# Patient Record
Sex: MALE | Race: WHITE | NOT HISPANIC OR LATINO
[De-identification: names, ages, dates, MRNs, and addresses within clinical notes are randomized per-mention and may not be internally consistent; named-entity substitution may affect disease eponyms.]

---

## 2017-01-06 ENCOUNTER — CHARTING TRANS (OUTPATIENT)
Dept: OTHER | Age: 77
End: 2017-01-06

## 2017-02-08 ENCOUNTER — CHARTING TRANS (OUTPATIENT)
Dept: OTHER | Age: 77
End: 2017-02-08

## 2017-03-01 ENCOUNTER — CHARTING TRANS (OUTPATIENT)
Dept: OTHER | Age: 77
End: 2017-03-01

## 2017-03-08 ENCOUNTER — CHARTING TRANS (OUTPATIENT)
Dept: INTERNAL MEDICINE | Age: 77
End: 2017-03-08

## 2017-03-10 ENCOUNTER — CHARTING TRANS (OUTPATIENT)
Dept: OTHER | Age: 77
End: 2017-03-10

## 2017-03-13 ENCOUNTER — CHARTING TRANS (OUTPATIENT)
Dept: OTHER | Age: 77
End: 2017-03-13

## 2017-03-22 ENCOUNTER — IMAGING SERVICES (OUTPATIENT)
Dept: OTHER | Age: 77
End: 2017-03-22

## 2017-05-15 ENCOUNTER — CHARTING TRANS (OUTPATIENT)
Dept: PULMONOLOGY | Age: 77
End: 2017-05-15

## 2017-09-11 ENCOUNTER — CHARTING TRANS (OUTPATIENT)
Dept: OTHER | Age: 77
End: 2017-09-11

## 2017-09-11 ENCOUNTER — LAB SERVICES (OUTPATIENT)
Dept: OTHER | Age: 77
End: 2017-09-11

## 2017-09-11 LAB
INTERNATIONAL NORMAL: 1
PROTHROMBIN TIME: 10.3 SECONDS
PTT: 22.8 SECONDS

## 2017-09-12 ENCOUNTER — CHARTING TRANS (OUTPATIENT)
Dept: OTHER | Age: 77
End: 2017-09-12

## 2017-09-19 ENCOUNTER — CHARTING TRANS (OUTPATIENT)
Dept: INTERNAL MEDICINE | Age: 77
End: 2017-09-19

## 2017-09-19 ENCOUNTER — CHARTING TRANS (OUTPATIENT)
Dept: OTHER | Age: 77
End: 2017-09-19

## 2017-09-20 ENCOUNTER — IMAGING SERVICES (OUTPATIENT)
Dept: OTHER | Age: 77
End: 2017-09-20

## 2017-09-20 ENCOUNTER — CHARTING TRANS (OUTPATIENT)
Dept: OTHER | Age: 77
End: 2017-09-20

## 2017-09-22 ENCOUNTER — CHARTING TRANS (OUTPATIENT)
Dept: OTHER | Age: 77
End: 2017-09-22

## 2017-09-25 ENCOUNTER — IMAGING SERVICES (OUTPATIENT)
Dept: OTHER | Age: 77
End: 2017-09-25

## 2017-09-25 ENCOUNTER — LAB SERVICES (OUTPATIENT)
Dept: OTHER | Age: 77
End: 2017-09-25

## 2017-09-25 ENCOUNTER — CHARTING TRANS (OUTPATIENT)
Dept: INTERNAL MEDICINE | Age: 77
End: 2017-09-25

## 2017-09-25 LAB
BILIRUBIN URINE: NEGATIVE
BLOOD URINE: ABNORMAL
CLARITY: CLEAR
COLOR: YELLOW
GLUCOSE QUALITATIVE U: NEGATIVE
KETONES, URINE: NEGATIVE
LEUKOCYTE ESTERASE URINE: NEGATIVE
NITRITE URINE: NEGATIVE
PH URINE: 5.5 (ref 5–7)
SPECIFIC GRAVITY URINE: 1.02 (ref 1–1.03)
URINE PROTEIN, QUAL (DIPSTICK): NEGATIVE
UROBILINOGEN URINE: 0.2

## 2017-09-26 LAB — FINAL REPORT: NORMAL

## 2017-09-28 ENCOUNTER — CHARTING TRANS (OUTPATIENT)
Dept: OTHER | Age: 77
End: 2017-09-28

## 2017-10-06 ENCOUNTER — CHARTING TRANS (OUTPATIENT)
Dept: OTHER | Age: 77
End: 2017-10-06

## 2017-10-06 ENCOUNTER — LAB SERVICES (OUTPATIENT)
Dept: OTHER | Age: 77
End: 2017-10-06

## 2017-10-10 ENCOUNTER — CHARTING TRANS (OUTPATIENT)
Dept: OTHER | Age: 77
End: 2017-10-10

## 2017-10-13 ENCOUNTER — CHARTING TRANS (OUTPATIENT)
Dept: OTHER | Age: 77
End: 2017-10-13

## 2017-10-24 ENCOUNTER — CHARTING TRANS (OUTPATIENT)
Dept: OTHER | Age: 77
End: 2017-10-24

## 2017-11-03 ENCOUNTER — CHARTING TRANS (OUTPATIENT)
Dept: UROLOGY | Age: 77
End: 2017-11-03

## 2017-12-07 ENCOUNTER — CHARTING TRANS (OUTPATIENT)
Dept: OTOLARYNGOLOGY | Age: 77
End: 2017-12-07

## 2018-01-02 LAB — STONE ANALYSIS: NORMAL

## 2018-01-03 ENCOUNTER — CHARTING TRANS (OUTPATIENT)
Dept: OTHER | Age: 78
End: 2018-01-03

## 2018-01-25 ENCOUNTER — LAB SERVICES (OUTPATIENT)
Dept: OTHER | Age: 78
End: 2018-01-25

## 2018-01-25 ENCOUNTER — CHARTING TRANS (OUTPATIENT)
Dept: OTHER | Age: 78
End: 2018-01-25

## 2018-01-25 LAB
INFLUENZA TYPE A ANTIGEN: NEGATIVE
INFLUENZA TYPE B ANTIGEN: NEGATIVE

## 2018-02-15 ENCOUNTER — CHARTING TRANS (OUTPATIENT)
Dept: OTHER | Age: 78
End: 2018-02-15

## 2018-02-19 ENCOUNTER — CHARTING TRANS (OUTPATIENT)
Dept: OTHER | Age: 78
End: 2018-02-19

## 2018-02-20 ENCOUNTER — LAB SERVICES (OUTPATIENT)
Dept: OTHER | Age: 78
End: 2018-02-20

## 2018-02-20 LAB
ALBUMIN SERPL BCG-MCNC: 4.4 G/DL (ref 3.6–5.1)
ALP SERPL-CCNC: 89 U/L (ref 30–130)
ALT SERPL W/O P-5'-P-CCNC: 32 U/L (ref 17–47)
AST SERPL-CCNC: 30 U/L (ref 14–43)
BILIRUB SERPL-MCNC: 0.6 MG/DL (ref 0–1.3)
BUN SERPL-MCNC: 18 MG/DL (ref 6–27)
CALCIUM SERPL-MCNC: 9.5 MG/DL (ref 8.6–10.6)
CHLORIDE SERPL-SCNC: 103 MMOL/L (ref 96–107)
CHOLEST SERPL-MCNC: 146 MG/DL (ref 140–200)
CREATININE, SERUM: 1.3 MG/DL (ref 0.6–1.6)
GFR SERPL CREATININE-BSD FRML MDRD: 54 ML/MIN/{1.73M2}
GFR SERPL CREATININE-BSD FRML MDRD: >60 ML/MIN/{1.73M2}
GLUCOSE P FAST SERPL-MCNC: 99 MG/DL (ref 60–100)
HCO3 SERPL-SCNC: 30 MMOL/L (ref 22–32)
HDLC SERPL-MCNC: 39 MG/DL
LDLC SERPL CALC-MCNC: 83 MG/DL (ref 30–100)
POTASSIUM SERPL-SCNC: 4.5 MMOL/L (ref 3.5–5.3)
PROT SERPL-MCNC: 7.2 G/DL (ref 6.4–8.5)
SODIUM SERPL-SCNC: 144 MMOL/L (ref 136–146)
TRIGL SERPL-MCNC: 122 MG/DL (ref 0–200)

## 2018-03-19 ENCOUNTER — LAB SERVICES (OUTPATIENT)
Dept: OTHER | Age: 78
End: 2018-03-19

## 2018-03-19 ENCOUNTER — CHARTING TRANS (OUTPATIENT)
Dept: OTHER | Age: 78
End: 2018-03-19

## 2018-03-19 LAB
T3FREE SERPL-MCNC: 4.6 PG/ML (ref 2.8–5.3)
TSH SERPL DL<=0.05 MIU/L-ACNC: 6.43 M[IU]/L (ref 0.3–4.82)
VIT B12 SERPL-MCNC: 179 PG/ML (ref 193–982)

## 2018-04-04 ENCOUNTER — CHARTING TRANS (OUTPATIENT)
Dept: OTHER | Age: 78
End: 2018-04-04

## 2018-04-06 ENCOUNTER — CHARTING TRANS (OUTPATIENT)
Dept: OTHER | Age: 78
End: 2018-04-06

## 2018-04-12 ENCOUNTER — CHARTING TRANS (OUTPATIENT)
Dept: OTHER | Age: 78
End: 2018-04-12

## 2018-04-12 ASSESSMENT — PAIN SCALES - GENERAL: PAINLEVEL_OUTOF10: 5

## 2018-04-24 ENCOUNTER — CHARTING TRANS (OUTPATIENT)
Dept: OTHER | Age: 78
End: 2018-04-24

## 2018-05-04 ENCOUNTER — ANCILLARY ORDERS (OUTPATIENT)
Dept: OTHER | Age: 78
End: 2018-05-04

## 2018-05-04 ENCOUNTER — CHARTING TRANS (OUTPATIENT)
Dept: OTHER | Age: 78
End: 2018-05-04

## 2018-05-04 DIAGNOSIS — N28.9 DISORDER OF KIDNEY AND URETER: ICD-10-CM

## 2018-05-04 DIAGNOSIS — N13.5 CROSSING VESSEL AND STRICTURE OF URETER WITHOUT HYDRONEPHROSIS: ICD-10-CM

## 2018-05-11 ENCOUNTER — CHARTING TRANS (OUTPATIENT)
Dept: OTHER | Age: 78
End: 2018-05-11

## 2018-05-11 ASSESSMENT — PAIN SCALES - GENERAL: PAINLEVEL_OUTOF10: 5

## 2018-06-01 ENCOUNTER — CHARTING TRANS (OUTPATIENT)
Dept: OTHER | Age: 78
End: 2018-06-01

## 2018-06-13 ENCOUNTER — CHARTING TRANS (OUTPATIENT)
Dept: OTHER | Age: 78
End: 2018-06-13

## 2018-06-25 ENCOUNTER — CHARTING TRANS (OUTPATIENT)
Dept: OTHER | Age: 78
End: 2018-06-25

## 2018-06-25 ENCOUNTER — LAB SERVICES (OUTPATIENT)
Dept: OTHER | Age: 78
End: 2018-06-25

## 2018-06-25 LAB — DEPRECATED S PYO AG THROAT QL EIA: NEGATIVE

## 2018-06-25 ASSESSMENT — PAIN SCALES - GENERAL: PAINLEVEL_OUTOF10: 5

## 2018-07-06 ENCOUNTER — LAB SERVICES (OUTPATIENT)
Dept: OTHER | Age: 78
End: 2018-07-06

## 2018-07-06 LAB
ALBUMIN SERPL-MCNC: 4.3 G/DL (ref 3.6–5.1)
ALP SERPL-CCNC: 97 U/L (ref 45–115)
ALT SERPL-CCNC: 26 U/L (ref 17–47)
AST SERPL-CCNC: 28 U/L (ref 14–43)
BASOPHIL %: 0.4 % (ref 0–1.2)
BASOPHIL ABSOLUTE #: 0 10*3/UL (ref 0–0.1)
BILIRUB SERPL-MCNC: 0.6 MG/DL (ref 0–1.3)
BUN SERPL-MCNC: 20 MG/DL (ref 6–27)
CALCIUM SERPL-MCNC: 9 MG/DL (ref 8.6–10.6)
CHLORIDE SERPL-SCNC: 105 MMOL/L (ref 96–107)
CO2 SERPL-SCNC: 28 MMOL/L (ref 22–32)
CREAT SERPL-MCNC: 1.3 MG/DL (ref 0.6–1.6)
DIFFERENTIAL TYPE: ABNORMAL
EOSINOPHIL %: 2.5 % (ref 0–10)
EOSINOPHIL ABSOLUTE #: 0.1 10*3/UL (ref 0–0.5)
GFR SERPL CREATININE-BSD FRML MDRD: 54 ML/MIN/{1.73M2}
GFR SERPL CREATININE-BSD FRML MDRD: >60 ML/MIN/{1.73M2}
GLUCOSE SERPL-MCNC: 82 MG/DL (ref 70–200)
HEMATOCRIT: 42.5 % (ref 40–51)
HEMOGLOBIN: 14.7 G/DL (ref 13.7–17.5)
LDH SERPL P TO L-CCNC: 497 U/L (ref 313–618)
LYMPH PERCENT: 19.6 % (ref 20.5–51.1)
LYMPHOCYTE ABSOLUTE #: 1.1 10*3/UL (ref 1.2–3.4)
MEAN CORPUSCULAR HGB CONCENTRATION: 34.6 % (ref 32–36)
MEAN CORPUSCULAR HGB: 31.9 PG (ref 27–34)
MEAN CORPUSCULAR VOLUME: 92.2 FL (ref 79–95)
MEAN PLATELET VOLUME: 10.1 FL (ref 8.6–12.4)
MONOCYTE ABSOLUTE #: 0.5 10*3/UL (ref 0.2–0.9)
MONOCYTE PERCENT: 8.4 % (ref 4.3–12.9)
NEUTROPHIL ABSOLUTE #: 3.9 10*3/UL (ref 1.4–6.5)
NEUTROPHIL PERCENT: 69.1 % (ref 34–73.5)
PLATELET COUNT: 179 10*3/UL (ref 150–400)
POTASSIUM SERPL-SCNC: 4.4 MMOL/L (ref 3.5–5.3)
PROT SERPL-MCNC: 7.1 G/DL (ref 6.4–8.5)
RED BLOOD CELL COUNT: 4.61 10*6/UL (ref 3.9–5.7)
RED CELL DISTRIBUTION WIDTH: 12.8 % (ref 11.3–14.8)
SODIUM SERPL-SCNC: 143 MMOL/L (ref 136–146)
T3FREE SERPL-MCNC: 3.9 PG/ML (ref 2.8–5.3)
TSH SERPL DL<=0.05 MIU/L-ACNC: 5.88 M[IU]/L (ref 0.3–4.82)
WHITE BLOOD CELL COUNT: 5.6 10*3/UL (ref 4–10)

## 2018-07-12 ENCOUNTER — CHARTING TRANS (OUTPATIENT)
Dept: OTHER | Age: 78
End: 2018-07-12

## 2018-08-25 ENCOUNTER — IMAGING SERVICES (OUTPATIENT)
Dept: OTHER | Age: 78
End: 2018-08-25

## 2018-08-26 ENCOUNTER — LAB SERVICES (OUTPATIENT)
Dept: OTHER | Age: 78
End: 2018-08-26

## 2018-08-26 LAB
APPEARANCE: CLEAR
BASOPHIL AUTOMATED: 0.1 %
BASOPHILS: 0 (ref 0–0.2)
BILIRUBIN: ABNORMAL
COLOR: YELLOW
EOSINOPHIL AUTOMATED: 1.8 %
EOSINOPHILS: 0.1 (ref 0–0.5)
GLUCOSE, URINE, AUTOMATED: ABNORMAL MG/DL
HEMATOCRIT: 43.3 % (ref 38.6–49.2)
HEMOGLOBIN: 14.9 GM/DL (ref 13–17)
KETONES, URINE, AUTOMATED: ABNORMAL MG/DL
LEUKOCYTE, URINE, AUTOMATED: ABNORMAL
LYMPHOCYTE AUTOMATED: 10.3 %
LYMPHOCYTES: 0.8 (ref 0.6–3.4)
MEAN CORPUSCULAR HGB: 31.1 PG (ref 26–34)
MEAN CORPUSCULAR HGB: 34.5 G/DL (ref 32.5–35.8)
MEAN CORPUSCULAR VOL: 90.2 FL (ref 80–100)
MEAN PLATELET VOLUME: 8.6 FL (ref 6.8–10.2)
MONOCYTE AUTOMATED: 8 %
MONOCYTES: 0.6 (ref 0.3–1)
NEUTROPHIL ABSOLUTE: 6 (ref 1.7–7.7)
NEUTROPHIL AUTOMATED: 79.8 %
NITRITE, URINE AUTOMATED: NEGATIVE
PH, URINE: 6 (ref 5–8)
PLATELET COUNT: 132 K/MM3 (ref 150–450)
PROTEIN, URINE: ABNORMAL MG/DL
RED BLOOD CELL COUNT: 4.8 M/MM3 (ref 4.34–5.6)
RED CELL DISTRIBUTIO: 13.9 % (ref 11.9–15.9)
SPECIFIC GRAVITY UA: 1.01 (ref 1–1.03)
URINE, BLOOD, AUTOMATED: ABNORMAL
UROBILINOGEN, URINE, AUTOMATED: ABNORMAL MG/DL
WHITE BLOOD CELL COU: 7.6 K/MM3 (ref 4–11)

## 2018-08-27 ENCOUNTER — CHARTING TRANS (OUTPATIENT)
Dept: OTHER | Age: 78
End: 2018-08-27

## 2018-08-28 ENCOUNTER — CHARTING TRANS (OUTPATIENT)
Dept: OTHER | Age: 78
End: 2018-08-28

## 2018-08-29 ENCOUNTER — CHARTING TRANS (OUTPATIENT)
Dept: OTHER | Age: 78
End: 2018-08-29

## 2018-08-30 ENCOUNTER — CHARTING TRANS (OUTPATIENT)
Dept: OTHER | Age: 78
End: 2018-08-30

## 2018-08-30 ENCOUNTER — IMAGING SERVICES (OUTPATIENT)
Dept: OTHER | Age: 78
End: 2018-08-30

## 2018-08-31 ENCOUNTER — CHARTING TRANS (OUTPATIENT)
Dept: OTHER | Age: 78
End: 2018-08-31

## 2018-08-31 ENCOUNTER — IMAGING SERVICES (OUTPATIENT)
Dept: OTHER | Age: 78
End: 2018-08-31

## 2018-09-04 ENCOUNTER — CHARTING TRANS (OUTPATIENT)
Dept: OTHER | Age: 78
End: 2018-09-04

## 2018-09-06 ENCOUNTER — CHARTING TRANS (OUTPATIENT)
Dept: OTHER | Age: 78
End: 2018-09-06

## 2018-09-07 ENCOUNTER — CHARTING TRANS (OUTPATIENT)
Dept: OTHER | Age: 78
End: 2018-09-07

## 2018-09-10 ENCOUNTER — CHARTING TRANS (OUTPATIENT)
Dept: OTHER | Age: 78
End: 2018-09-10

## 2018-09-12 ENCOUNTER — IMAGING SERVICES (OUTPATIENT)
Dept: OTHER | Age: 78
End: 2018-09-12

## 2018-09-14 ENCOUNTER — CHARTING TRANS (OUTPATIENT)
Dept: OTHER | Age: 78
End: 2018-09-14

## 2018-09-28 ENCOUNTER — CHARTING TRANS (OUTPATIENT)
Dept: OTHER | Age: 78
End: 2018-09-28

## 2018-10-15 ENCOUNTER — CHARTING TRANS (OUTPATIENT)
Dept: OTHER | Age: 78
End: 2018-10-15

## 2018-10-26 ENCOUNTER — CHARTING TRANS (OUTPATIENT)
Dept: OTHER | Age: 78
End: 2018-10-26

## 2018-10-29 ENCOUNTER — CHARTING TRANS (OUTPATIENT)
Dept: OTHER | Age: 78
End: 2018-10-29

## 2018-11-01 ENCOUNTER — CHARTING TRANS (OUTPATIENT)
Dept: OTHER | Age: 78
End: 2018-11-01

## 2018-11-23 ENCOUNTER — IMAGING SERVICES (OUTPATIENT)
Dept: OTHER | Age: 78
End: 2018-11-23

## 2018-11-27 VITALS — HEIGHT: 72 IN | BODY MASS INDEX: 25.06 KG/M2 | WEIGHT: 185 LBS

## 2018-11-28 VITALS
HEIGHT: 72 IN | SYSTOLIC BLOOD PRESSURE: 112 MMHG | WEIGHT: 191 LBS | HEART RATE: 66 BPM | BODY MASS INDEX: 25.87 KG/M2 | DIASTOLIC BLOOD PRESSURE: 72 MMHG | OXYGEN SATURATION: 95 %

## 2018-11-28 VITALS
HEART RATE: 63 BPM | DIASTOLIC BLOOD PRESSURE: 64 MMHG | RESPIRATION RATE: 16 BRPM | SYSTOLIC BLOOD PRESSURE: 100 MMHG | TEMPERATURE: 98.5 F | WEIGHT: 186 LBS | OXYGEN SATURATION: 96 %

## 2018-11-28 VITALS
HEART RATE: 65 BPM | BODY MASS INDEX: 25.19 KG/M2 | HEIGHT: 72 IN | SYSTOLIC BLOOD PRESSURE: 92 MMHG | TEMPERATURE: 96.3 F | DIASTOLIC BLOOD PRESSURE: 60 MMHG | WEIGHT: 186 LBS | OXYGEN SATURATION: 95 % | RESPIRATION RATE: 16 BRPM

## 2018-11-28 VITALS — HEIGHT: 72 IN | WEIGHT: 185 LBS | TEMPERATURE: 96.8 F | BODY MASS INDEX: 25.06 KG/M2

## 2018-11-29 ENCOUNTER — CHARTING TRANS (OUTPATIENT)
Dept: OTHER | Age: 78
End: 2018-11-29

## 2018-11-29 ENCOUNTER — LAB SERVICES (OUTPATIENT)
Dept: OTHER | Age: 78
End: 2018-11-29

## 2018-11-29 ENCOUNTER — EXTERNAL RECORD (OUTPATIENT)
Dept: UROLOGY | Age: 78
End: 2018-11-29

## 2018-11-29 VITALS
HEART RATE: 59 BPM | HEIGHT: 72 IN | DIASTOLIC BLOOD PRESSURE: 64 MMHG | BODY MASS INDEX: 25.87 KG/M2 | WEIGHT: 191 LBS | TEMPERATURE: 96.6 F | OXYGEN SATURATION: 95 % | RESPIRATION RATE: 16 BRPM | SYSTOLIC BLOOD PRESSURE: 120 MMHG

## 2018-11-29 LAB
A/G RATIO: 1.3 (ref 1.1–2.4)
ALANINE AMINOTRANSFE: 17 U/L (ref 7–52)
ALBUMIN, SERUM (ALB): 3.9 G/DL (ref 3.5–5.7)
ALKALINE PHOSPHATASE (ALK): 73 U/L (ref 34–104)
ANION GAP: 8 MEQ/L (ref 8–16)
APPEARANCE: ABNORMAL
ASPARTATE AMINOTRANS: 20 U/L (ref 13–39)
BACTERIA: ABNORMAL /HPF
BASOPHIL AUTOMATED: 0.2 %
BASOPHILS: 0 (ref 0–0.2)
BEDSIDE VENOUS BASE EXCESS: 0 MMOL/L (ref -2–3)
BEDSIDE VENOUS HCO3: 23 MMOL/L (ref 23–28)
BEDSIDE VENOUS LACTATE: 1.47 MMOL/L (ref 0.9–2)
BEDSIDE VENOUS O2 SATURATION: 98 %
BEDSIDE VENOUS PCO2: 31 MMHG (ref 41–51)
BEDSIDE VENOUS PH: 7.48 (ref 7.31–7.41)
BEDSIDE VENOUS PO2: 89 MMHG
BEDSIDE VENOUS TCO2: 24 MMOL/L (ref 24–29)
BILIRUBIN, TOTAL: 0.7 MG/DL (ref 0.2–0.8)
BILIRUBIN: ABNORMAL
BLOOD UREA NITROGEN (BUN): 23 MG/DL (ref 7–25)
BUN/CREATININE RATIO: 16.9 (ref 7.4–23)
CALCIUM, SERUM: 9.3 MG/DL (ref 8.6–10.3)
CARBON DIOXIDE: 25 MMOL/L (ref 21–31)
CHLORIDE, SERUM: 105 MMOL/L (ref 98–107)
COLOR: YELLOW
CREATININE: 1.36 MG/DL (ref 0.7–1.3)
CULTURE REFLEX COMMENT: YES
EOSINOPHIL AUTOMATED: 1.7 %
EOSINOPHILS: 0.1 (ref 0–0.5)
EST GLOMERULAR FILTRATION RATE: 51 1.73M SQ
GLUCOSE P FAST SERPL-MCNC: 146 MG/DL (ref 70–99)
GLUCOSE, URINE, AUTOMATED: ABNORMAL MG/DL
HEMATOCRIT: 40.6 % (ref 38.6–49.2)
HEMOGLOBIN: 14.6 GM/DL (ref 13–17)
K (POTASSIUM, SERUM): 4 MMOL/L (ref 3.5–5.1)
KETONES, URINE, AUTOMATED: ABNORMAL MG/DL
LEUKOCYTE, URINE, AUTOMATED: ABNORMAL
LIPASE: 18 U/L (ref 11–82)
LYMPHOCYTE AUTOMATED: 5.2 %
LYMPHOCYTES: 0.5 (ref 0.6–3.4)
MEAN CORPUSCULAR HGB: 32.1 PG (ref 26–34)
MEAN CORPUSCULAR HGB: 35.9 G/DL (ref 32.5–35.8)
MEAN CORPUSCULAR VOL: 89.3 FL (ref 80–100)
MEAN PLATELET VOLUME: 8.3 FL (ref 6.8–10.2)
MONOCYTE AUTOMATED: 3.5 %
MONOCYTES: 0.3 (ref 0.3–1)
NA (SODIUM, SERUM): 138 MMOL/L (ref 133–144)
NEUTROPHIL ABSOLUTE: 7.9 (ref 1.7–7.7)
NEUTROPHIL AUTOMATED: 89.4 %
NITRITE, URINE AUTOMATED: POSITIVE
PH, URINE: 6 (ref 5–8)
PLATELET COUNT: 127 K/MM3 (ref 150–450)
PROTEIN TOTAL: 6.9 G/DL (ref 6.4–8.9)
PROTEIN, URINE: ABNORMAL MG/DL
RBC: ABNORMAL /HPF (ref 0–2)
RED BLOOD CELL COUNT: 4.55 M/MM3 (ref 4.34–5.6)
RED CELL DISTRIBUTIO: 13.9 % (ref 11.9–15.9)
SPECIFIC GRAVITY UA: 1.01 (ref 1–1.03)
SQUAMOUS EPITHELIAL: ABNORMAL /LPF
URINE, BLOOD, AUTOMATED: ABNORMAL
UROBILINOGEN, URINE, AUTOMATED: ABNORMAL MG/DL
WBC: ABNORMAL /HPF (ref 0–5)
WHITE BLOOD CELL COU: 8.8 K/MM3 (ref 4–11)

## 2018-11-30 ENCOUNTER — EXTERNAL RECORD (OUTPATIENT)
Dept: OTHER | Age: 78
End: 2018-11-30

## 2018-12-01 ENCOUNTER — IMAGING SERVICES (OUTPATIENT)
Dept: OTHER | Age: 78
End: 2018-12-01

## 2018-12-03 ENCOUNTER — TELEPHONE (OUTPATIENT)
Dept: INTERNAL MEDICINE | Age: 78
End: 2018-12-03

## 2018-12-04 LAB
BLOOD CULTURE: NORMAL
BLOOD CULTURE: NORMAL

## 2018-12-07 DIAGNOSIS — C69.91: Primary | ICD-10-CM

## 2018-12-11 ENCOUNTER — OFFICE VISIT (OUTPATIENT)
Dept: INTERNAL MEDICINE | Age: 78
End: 2018-12-11

## 2018-12-11 VITALS
HEART RATE: 57 BPM | BODY MASS INDEX: 24.92 KG/M2 | WEIGHT: 184 LBS | SYSTOLIC BLOOD PRESSURE: 100 MMHG | TEMPERATURE: 97.3 F | DIASTOLIC BLOOD PRESSURE: 64 MMHG | HEIGHT: 72 IN

## 2018-12-11 DIAGNOSIS — N30.00 ACUTE CYSTITIS WITHOUT HEMATURIA: Primary | ICD-10-CM

## 2018-12-11 DIAGNOSIS — C69.91 MALIGNANT NEOPLASM OF RIGHT EYE (CMD): Primary | ICD-10-CM

## 2018-12-11 DIAGNOSIS — I25.10 ATHEROSCLEROSIS OF NATIVE CORONARY ARTERY OF NATIVE HEART WITHOUT ANGINA PECTORIS: ICD-10-CM

## 2018-12-11 DIAGNOSIS — C69.91: Primary | ICD-10-CM

## 2018-12-11 DIAGNOSIS — N28.9 NONFUNCTIONING KIDNEY: ICD-10-CM

## 2018-12-11 PROBLEM — N13.8 BPH WITH OBSTRUCTION/LOWER URINARY TRACT SYMPTOMS: Status: ACTIVE | Noted: 2017-11-03

## 2018-12-11 PROBLEM — C69.31 CHOROIDAL MALIGNANT MELANOMA, RIGHT (CMD): Status: ACTIVE | Noted: 2017-11-10

## 2018-12-11 PROBLEM — N40.1 BPH WITH OBSTRUCTION/LOWER URINARY TRACT SYMPTOMS: Status: ACTIVE | Noted: 2017-11-03

## 2018-12-11 PROCEDURE — 99214 OFFICE O/P EST MOD 30 MIN: CPT | Performed by: INTERNAL MEDICINE

## 2018-12-11 RX ORDER — CLOPIDOGREL BISULFATE 75 MG/1
75 TABLET ORAL
COMMUNITY
Start: 2018-10-29 | End: 2019-01-01 | Stop reason: SDUPTHER

## 2018-12-11 RX ORDER — MULTIVITAMIN
CAPSULE ORAL
COMMUNITY
Start: 2014-07-29 | End: 2019-06-19 | Stop reason: SDUPTHER

## 2018-12-11 RX ORDER — CEPHALEXIN 250 MG/1
250 CAPSULE ORAL 4 TIMES DAILY
COMMUNITY
End: 2018-12-11 | Stop reason: ALTCHOICE

## 2018-12-11 RX ORDER — ACETAMINOPHEN 325 MG/1
TABLET ORAL
COMMUNITY

## 2018-12-11 RX ORDER — SIMVASTATIN 40 MG
TABLET ORAL
COMMUNITY
Start: 2018-10-29 | End: 2019-01-01 | Stop reason: SDUPTHER

## 2018-12-11 RX ORDER — MECLIZINE HYDROCHLORIDE 25 MG/1
TABLET ORAL
COMMUNITY
Start: 2018-10-29 | End: 2019-05-30 | Stop reason: SDUPTHER

## 2018-12-11 RX ORDER — FOLIC ACID 100 %
POWDER (GRAM) MISCELLANEOUS
COMMUNITY
End: 2020-01-01 | Stop reason: ALTCHOICE

## 2018-12-11 SDOH — HEALTH STABILITY: MENTAL HEALTH: HOW OFTEN DO YOU HAVE A DRINK CONTAINING ALCOHOL?: NEVER

## 2018-12-13 ENCOUNTER — OFFICE VISIT (OUTPATIENT)
Dept: UROLOGY | Age: 78
End: 2018-12-13

## 2018-12-13 VITALS — HEIGHT: 72 IN | WEIGHT: 179 LBS | BODY MASS INDEX: 24.24 KG/M2 | TEMPERATURE: 98.2 F

## 2018-12-13 DIAGNOSIS — N28.9 NON-FUNCTIONING KIDNEY: Primary | ICD-10-CM

## 2018-12-13 DIAGNOSIS — N39.0 FREQUENT UTI: ICD-10-CM

## 2018-12-13 DIAGNOSIS — N13.5 URETERAL STRICTURE, RIGHT: ICD-10-CM

## 2018-12-13 PROCEDURE — 99214 OFFICE O/P EST MOD 30 MIN: CPT | Performed by: UROLOGY

## 2018-12-17 ENCOUNTER — TELEPHONE (OUTPATIENT)
Dept: UROLOGY | Age: 78
End: 2018-12-17

## 2018-12-19 ENCOUNTER — TELEPHONE (OUTPATIENT)
Dept: UROLOGY | Age: 78
End: 2018-12-19

## 2018-12-19 DIAGNOSIS — N28.9 NON-FUNCTIONING KIDNEY: ICD-10-CM

## 2018-12-19 DIAGNOSIS — Z01.818 PRE-OP TESTING: Primary | ICD-10-CM

## 2018-12-19 DIAGNOSIS — N13.5 URETERAL STRICTURE, RIGHT: ICD-10-CM

## 2018-12-19 DIAGNOSIS — Z01.818 PREOP EXAMINATION: Primary | ICD-10-CM

## 2018-12-20 ENCOUNTER — TELEPHONE (OUTPATIENT)
Dept: INTERNAL MEDICINE | Age: 78
End: 2018-12-20

## 2018-12-24 ENCOUNTER — TELEPHONE (OUTPATIENT)
Dept: GASTROENTEROLOGY | Age: 78
End: 2018-12-24

## 2018-12-27 ENCOUNTER — NURSE ONLY (OUTPATIENT)
Dept: INTERNAL MEDICINE | Age: 78
End: 2018-12-27

## 2018-12-27 DIAGNOSIS — D51.0 VITAMIN B12 DEFICIENCY ANEMIA DUE TO INTRINSIC FACTOR DEFICIENCY: Primary | ICD-10-CM

## 2018-12-27 PROCEDURE — 96372 THER/PROPH/DIAG INJ SC/IM: CPT | Performed by: INTERNAL MEDICINE

## 2018-12-27 RX ORDER — CYANOCOBALAMIN 1000 UG/ML
1000 INJECTION, SOLUTION INTRAMUSCULAR; SUBCUTANEOUS
Status: COMPLETED | OUTPATIENT
Start: 2018-12-27 | End: 2019-02-22

## 2018-12-27 RX ADMIN — CYANOCOBALAMIN 1000 MCG: 1000 INJECTION, SOLUTION INTRAMUSCULAR; SUBCUTANEOUS at 13:56

## 2019-01-01 ENCOUNTER — APPOINTMENT (OUTPATIENT)
Dept: INTERNAL MEDICINE | Age: 79
End: 2019-01-01

## 2019-01-01 ENCOUNTER — NURSE ONLY (OUTPATIENT)
Dept: INTERNAL MEDICINE | Age: 79
End: 2019-01-01

## 2019-01-01 ENCOUNTER — EXTERNAL RECORD (OUTPATIENT)
Dept: HEALTH INFORMATION MANAGEMENT | Facility: OTHER | Age: 79
End: 2019-01-01

## 2019-01-01 ENCOUNTER — TELEPHONE (OUTPATIENT)
Dept: AUDIOLOGY | Age: 79
End: 2019-01-01

## 2019-01-01 ENCOUNTER — OFFICE VISIT (OUTPATIENT)
Dept: PULMONOLOGY | Age: 79
End: 2019-01-01

## 2019-01-01 ENCOUNTER — OFFICE VISIT (OUTPATIENT)
Dept: AUDIOLOGY | Age: 79
End: 2019-01-01

## 2019-01-01 ENCOUNTER — APPOINTMENT (OUTPATIENT)
Dept: URGENT CARE | Age: 79
End: 2019-01-01

## 2019-01-01 ENCOUNTER — TELEPHONE (OUTPATIENT)
Dept: INTERNAL MEDICINE | Age: 79
End: 2019-01-01

## 2019-01-01 ENCOUNTER — TELEPHONE (OUTPATIENT)
Dept: PULMONOLOGY | Age: 79
End: 2019-01-01

## 2019-01-01 ENCOUNTER — APPOINTMENT (OUTPATIENT)
Dept: PULMONOLOGY | Age: 79
End: 2019-01-01

## 2019-01-01 VITALS
HEART RATE: 60 BPM | WEIGHT: 180 LBS | DIASTOLIC BLOOD PRESSURE: 64 MMHG | BODY MASS INDEX: 25.1 KG/M2 | SYSTOLIC BLOOD PRESSURE: 110 MMHG | OXYGEN SATURATION: 98 %

## 2019-01-01 VITALS
DIASTOLIC BLOOD PRESSURE: 60 MMHG | BODY MASS INDEX: 25.2 KG/M2 | SYSTOLIC BLOOD PRESSURE: 90 MMHG | WEIGHT: 180 LBS | OXYGEN SATURATION: 97 % | HEART RATE: 91 BPM | HEIGHT: 71 IN

## 2019-01-01 DIAGNOSIS — H90.3 SENSORINEURAL HEARING LOSS (SNHL) OF BOTH EARS: Primary | ICD-10-CM

## 2019-01-01 DIAGNOSIS — E53.8 B12 DEFICIENCY: Primary | ICD-10-CM

## 2019-01-01 DIAGNOSIS — G47.33 SLEEP APNEA, OBSTRUCTIVE: Primary | ICD-10-CM

## 2019-01-01 DIAGNOSIS — R79.89 LOW VITAMIN B12 LEVEL: Primary | ICD-10-CM

## 2019-01-01 DIAGNOSIS — G47.52 RBD (REM BEHAVIORAL DISORDER): ICD-10-CM

## 2019-01-01 DIAGNOSIS — E53.8 VITAMIN B12 DEFICIENCY: Primary | ICD-10-CM

## 2019-01-01 DIAGNOSIS — E53.8 B12 DEFICIENCY: ICD-10-CM

## 2019-01-01 DIAGNOSIS — Z01.10 ENCOUNTER FOR HEARING TEST: Primary | ICD-10-CM

## 2019-01-01 DIAGNOSIS — Z23 NEED FOR IMMUNIZATION AGAINST INFLUENZA: Primary | ICD-10-CM

## 2019-01-01 PROCEDURE — 92591 HEARING AID EXAM, BOTH EARS: CPT | Performed by: AUDIOLOGIST

## 2019-01-01 PROCEDURE — X1094 NO CHARGE VISIT: HCPCS | Performed by: AUDIOLOGIST

## 2019-01-01 PROCEDURE — 96372 THER/PROPH/DIAG INJ SC/IM: CPT | Performed by: INTERNAL MEDICINE

## 2019-01-01 PROCEDURE — 99213 OFFICE O/P EST LOW 20 MIN: CPT | Performed by: INTERNAL MEDICINE

## 2019-01-01 PROCEDURE — V5261 HEARING AID, DIGIT, BIN, BTE: HCPCS | Performed by: AUDIOLOGIST

## 2019-01-01 PROCEDURE — 90686 IIV4 VACC NO PRSV 0.5 ML IM: CPT

## 2019-01-01 PROCEDURE — 92557 COMPREHENSIVE HEARING TEST: CPT | Performed by: AUDIOLOGIST

## 2019-01-01 PROCEDURE — 99214 OFFICE O/P EST MOD 30 MIN: CPT | Performed by: INTERNAL MEDICINE

## 2019-01-01 PROCEDURE — 90471 IMMUNIZATION ADMIN: CPT

## 2019-01-01 RX ORDER — SIMVASTATIN 40 MG
TABLET ORAL
Qty: 90 TABLET | Refills: 0 | Status: SHIPPED | OUTPATIENT
Start: 2019-01-01 | End: 2020-01-01 | Stop reason: SDUPTHER

## 2019-01-01 RX ORDER — CLOPIDOGREL BISULFATE 75 MG/1
TABLET ORAL
Qty: 90 TABLET | Refills: 0 | Status: SHIPPED | OUTPATIENT
Start: 2019-01-01 | End: 2020-01-01 | Stop reason: SDUPTHER

## 2019-01-01 RX ORDER — MECLIZINE HYDROCHLORIDE 25 MG/1
TABLET ORAL
Qty: 30 TABLET | Refills: 0 | OUTPATIENT
Start: 2019-01-01

## 2019-01-01 RX ORDER — MECLIZINE HYDROCHLORIDE 25 MG/1
TABLET ORAL
Qty: 30 TABLET | Refills: 0 | Status: SHIPPED | OUTPATIENT
Start: 2019-01-01 | End: 2020-01-01 | Stop reason: SDUPTHER

## 2019-01-01 RX ADMIN — CYANOCOBALAMIN 1000 MCG: 1000 INJECTION, SOLUTION INTRAMUSCULAR; SUBCUTANEOUS at 10:07

## 2019-01-01 RX ADMIN — CYANOCOBALAMIN 1000 MCG: 1000 INJECTION, SOLUTION INTRAMUSCULAR; SUBCUTANEOUS at 09:36

## 2019-01-01 RX ADMIN — CYANOCOBALAMIN 1000 MCG: 1000 INJECTION, SOLUTION INTRAMUSCULAR; SUBCUTANEOUS at 09:20

## 2019-01-01 RX ADMIN — CYANOCOBALAMIN 1000 MCG: 1000 INJECTION, SOLUTION INTRAMUSCULAR; SUBCUTANEOUS at 08:44

## 2019-01-02 ENCOUNTER — TELEPHONE (OUTPATIENT)
Dept: HEMATOLOGY/ONCOLOGY | Age: 79
End: 2019-01-02

## 2019-01-07 ENCOUNTER — LAB SERVICES (OUTPATIENT)
Dept: LAB | Age: 79
End: 2019-01-07

## 2019-01-07 ENCOUNTER — IMAGING SERVICES (OUTPATIENT)
Dept: GENERAL RADIOLOGY | Age: 79
End: 2019-01-07
Attending: INTERNAL MEDICINE

## 2019-01-07 DIAGNOSIS — Z01.818 PRE-OP TESTING: ICD-10-CM

## 2019-01-07 DIAGNOSIS — N13.5 URETERAL STRICTURE, RIGHT: ICD-10-CM

## 2019-01-07 DIAGNOSIS — Z01.818 PREOP EXAMINATION: ICD-10-CM

## 2019-01-07 DIAGNOSIS — N28.9 NON-FUNCTIONING KIDNEY: ICD-10-CM

## 2019-01-07 DIAGNOSIS — C69.91: ICD-10-CM

## 2019-01-07 DIAGNOSIS — C69.91 MALIGNANT NEOPLASM OF RIGHT EYE (CMD): ICD-10-CM

## 2019-01-07 LAB
ALBUMIN SERPL BCG-MCNC: 4.8 G/DL (ref 3.6–5.1)
ALP SERPL-CCNC: 102 U/L (ref 45–115)
ALT SERPL W/O P-5'-P-CCNC: 21 U/L (ref 10–35)
AST SERPL-CCNC: 24 U/L (ref 9–37)
BACTERIA: ABNORMAL
BILIRUB SERPL-MCNC: 0.5 MG/DL (ref 0–1)
BILIRUBIN URINE: NEGATIVE
BLOOD URINE: ABNORMAL
BUN SERPL-MCNC: 19 MG/DL (ref 6–27)
CALCIUM SERPL-MCNC: 9.4 MG/DL (ref 8.6–10.6)
CHLORIDE SERPL-SCNC: 101 MMOL/L (ref 96–107)
CLARITY: ABNORMAL
COLOR: YELLOW
CREAT SERPL-MCNC: 1.2 MG/DL (ref 0.6–1.6)
DIFFERENTIAL TYPE: NORMAL
GFR SERPL CREATININE-BSD FRML MDRD: 56 ML/MIN/{1.73M2}
GFR SERPL CREATININE-BSD FRML MDRD: >60 ML/MIN/{1.73M2}
GLUCOSE QUALITATIVE U: NEGATIVE
GLUCOSE SERPL-MCNC: 102 MG/DL (ref 70–200)
HCO3 SERPL-SCNC: 27 MMOL/L (ref 22–32)
HEMATOCRIT: 44.6 % (ref 40–51)
HEMOGLOBIN: 15 G/DL (ref 13.7–17.5)
KETONES, URINE: NEGATIVE
LDH SERPL P TO L-CCNC: 544 U/L (ref 313–618)
LEUKOCYTE ESTERASE URINE: ABNORMAL
LYMPH PERCENT: 27.5 % (ref 20.5–51.1)
LYMPHOCYTE ABSOLUTE #: 1.4 10*3/UL (ref 1.2–3.4)
MEAN CORPUSCULAR HGB CONCENTRATION: 33.6 % (ref 32–36)
MEAN CORPUSCULAR HGB: 31 PG (ref 27–34)
MEAN CORPUSCULAR VOLUME: 92.1 FL (ref 79–95)
MEAN PLATELET VOLUME: 9.7 FL (ref 8.6–12.4)
MIXED %: 10.6 % (ref 4.3–12.9)
MIXED ABSOLUTE #: 0.5 10*3/UL (ref 0.2–0.9)
NEUTROPHIL ABSOLUTE #: 3.2 10*3/UL (ref 1.4–6.5)
NEUTROPHIL PERCENT: 61.9 % (ref 34–73.5)
NITRITE URINE: NEGATIVE
PH URINE: 6.5 (ref 5–7)
PLATELET COUNT: 168 10*3/UL (ref 150–400)
POTASSIUM SERPL-SCNC: 4.3 MMOL/L (ref 3.5–5.3)
PROT SERPL-MCNC: 7.5 G/DL (ref 6.2–8.1)
PTT: 25.9 S (ref 24–38)
RED BLOOD CELL COUNT: 4.84 10*6/UL (ref 3.9–5.7)
RED BLOOD CELLS URINE: ABNORMAL (ref 0–3)
RED CELL DISTRIBUTION WIDTH: 13.1 % (ref 11.3–14.8)
SODIUM SERPL-SCNC: 139 MMOL/L (ref 136–146)
SPECIFIC GRAVITY URINE: 1.01 (ref 1–1.03)
SQUAMOUS EPITHELIAL CELLS: ABNORMAL
URINE PROTEIN, QUAL (DIPSTICK): NEGATIVE
UROBILINOGEN URINE: 0.2
WHITE BLOOD CELL COUNT: 5.1 10*3/UL (ref 4–10)
WHITE BLOOD CELLS URINE: >100 (ref 0–5)

## 2019-01-07 PROCEDURE — 71046 X-RAY EXAM CHEST 2 VIEWS: CPT | Performed by: RADIOLOGY

## 2019-01-07 PROCEDURE — 83615 LACTATE (LD) (LDH) ENZYME: CPT | Performed by: INTERNAL MEDICINE

## 2019-01-07 PROCEDURE — 85730 THROMBOPLASTIN TIME PARTIAL: CPT | Performed by: INTERNAL MEDICINE

## 2019-01-07 PROCEDURE — 87086 URINE CULTURE/COLONY COUNT: CPT | Performed by: INTERNAL MEDICINE

## 2019-01-07 PROCEDURE — 85025 COMPLETE CBC W/AUTO DIFF WBC: CPT | Performed by: INTERNAL MEDICINE

## 2019-01-07 PROCEDURE — 87088 URINE BACTERIA CULTURE: CPT | Performed by: INTERNAL MEDICINE

## 2019-01-07 PROCEDURE — 80053 COMPREHEN METABOLIC PANEL: CPT | Performed by: INTERNAL MEDICINE

## 2019-01-07 PROCEDURE — 36415 COLL VENOUS BLD VENIPUNCTURE: CPT | Performed by: INTERNAL MEDICINE

## 2019-01-07 PROCEDURE — 87186 SC STD MICRODIL/AGAR DIL: CPT | Performed by: INTERNAL MEDICINE

## 2019-01-07 PROCEDURE — 81001 URINALYSIS AUTO W/SCOPE: CPT | Performed by: INTERNAL MEDICINE

## 2019-01-09 LAB — FINAL REPORT: ABNORMAL

## 2019-01-10 ENCOUNTER — OFFICE VISIT (OUTPATIENT)
Dept: INTERNAL MEDICINE | Age: 79
End: 2019-01-10

## 2019-01-10 VITALS
TEMPERATURE: 96.8 F | DIASTOLIC BLOOD PRESSURE: 70 MMHG | HEART RATE: 61 BPM | HEIGHT: 72 IN | WEIGHT: 182 LBS | BODY MASS INDEX: 24.65 KG/M2 | SYSTOLIC BLOOD PRESSURE: 100 MMHG | RESPIRATION RATE: 16 BRPM

## 2019-01-10 DIAGNOSIS — E78.5 HYPERLIPIDEMIA, UNSPECIFIED HYPERLIPIDEMIA TYPE: ICD-10-CM

## 2019-01-10 DIAGNOSIS — I25.10 ATHEROSCLEROSIS OF NATIVE CORONARY ARTERY OF NATIVE HEART WITHOUT ANGINA PECTORIS: ICD-10-CM

## 2019-01-10 DIAGNOSIS — Z01.818 PREOP EXAMINATION: Primary | ICD-10-CM

## 2019-01-10 DIAGNOSIS — G47.33 SLEEP APNEA, OBSTRUCTIVE: ICD-10-CM

## 2019-01-10 DIAGNOSIS — N28.9 NONFUNCTIONING KIDNEY: ICD-10-CM

## 2019-01-10 PROBLEM — C69.91: Status: RESOLVED | Noted: 2018-02-19 | Resolved: 2019-01-10

## 2019-01-10 PROBLEM — N13.5 URETERAL STRICTURE, RIGHT: Status: RESOLVED | Noted: 2018-12-13 | Resolved: 2019-01-10

## 2019-01-10 PROCEDURE — 99214 OFFICE O/P EST MOD 30 MIN: CPT | Performed by: INTERNAL MEDICINE

## 2019-01-10 RX ORDER — CLOPIDOGREL BISULFATE 75 MG/1
75 TABLET ORAL
Status: CANCELLED | OUTPATIENT
Start: 2019-01-10

## 2019-01-10 RX ORDER — SIMVASTATIN 40 MG
40 TABLET ORAL NIGHTLY
Status: CANCELLED | OUTPATIENT
Start: 2019-01-10

## 2019-01-10 ASSESSMENT — ENCOUNTER SYMPTOMS
FACIAL ASYMMETRY: 0
WEAKNESS: 0
APNEA: 0
POLYPHAGIA: 0
SPEECH DIFFICULTY: 0
ABDOMINAL DISTENTION: 0
CONSTIPATION: 0
BLOOD IN STOOL: 0
SLEEP DISTURBANCE: 0
CHEST TIGHTNESS: 0
ABDOMINAL PAIN: 0
DIARRHEA: 0
POLYDIPSIA: 0
ACTIVITY CHANGE: 0
RECTAL PAIN: 0
LIGHT-HEADEDNESS: 0
PHOTOPHOBIA: 0
VOMITING: 0
CHOKING: 0
FATIGUE: 0
SHORTNESS OF BREATH: 0
EYE REDNESS: 0
CHILLS: 0
BACK PAIN: 0
DIAPHORESIS: 0
CONFUSION: 0
UNEXPECTED WEIGHT CHANGE: 0
NERVOUS/ANXIOUS: 0
TROUBLE SWALLOWING: 0
EYE PAIN: 0
NAUSEA: 0
NUMBNESS: 0
DIZZINESS: 0
VOICE CHANGE: 0
TREMORS: 0
FEVER: 0
HEADACHES: 0
ADENOPATHY: 0

## 2019-01-11 ENCOUNTER — TELEPHONE (OUTPATIENT)
Dept: UROLOGY | Age: 79
End: 2019-01-11

## 2019-01-11 RX ORDER — CIPROFLOXACIN 500 MG/1
500 TABLET, FILM COATED ORAL 2 TIMES DAILY
Qty: 20 TABLET | Refills: 0 | Status: SHIPPED | OUTPATIENT
Start: 2019-01-11 | End: 2019-01-14

## 2019-01-14 ENCOUNTER — OFFICE VISIT (OUTPATIENT)
Dept: HEMATOLOGY/ONCOLOGY | Age: 79
End: 2019-01-14

## 2019-01-14 ENCOUNTER — TELEPHONE (OUTPATIENT)
Dept: UROLOGY | Age: 79
End: 2019-01-14

## 2019-01-14 DIAGNOSIS — C69.31 CHOROIDAL MALIGNANT MELANOMA, RIGHT (CMD): Primary | ICD-10-CM

## 2019-01-14 PROCEDURE — 99214 OFFICE O/P EST MOD 30 MIN: CPT | Performed by: INTERNAL MEDICINE

## 2019-01-14 RX ORDER — MULTIVIT WITH MINERALS/LUTEIN
1000 TABLET ORAL DAILY
COMMUNITY

## 2019-01-14 RX ORDER — LEVOFLOXACIN 500 MG/1
500 TABLET, FILM COATED ORAL DAILY
Qty: 7 TABLET | Refills: 0 | Status: SHIPPED | OUTPATIENT
Start: 2019-01-14 | End: 2019-01-21

## 2019-01-14 SDOH — HEALTH STABILITY: MENTAL HEALTH: HOW OFTEN DO YOU HAVE A DRINK CONTAINING ALCOHOL?: NEVER

## 2019-01-14 ASSESSMENT — PAIN SCALES - GENERAL: PAINLEVEL: 0

## 2019-01-18 ENCOUNTER — TELEPHONE (OUTPATIENT)
Dept: UROLOGY | Age: 79
End: 2019-01-18

## 2019-01-18 DIAGNOSIS — N39.0 URINARY TRACT INFECTION WITHOUT HEMATURIA, SITE UNSPECIFIED: Primary | ICD-10-CM

## 2019-01-21 ENCOUNTER — TELEPHONE (OUTPATIENT)
Dept: ANTICOAGULATION | Age: 79
End: 2019-01-21

## 2019-01-21 ENCOUNTER — TELEPHONE (OUTPATIENT)
Dept: UROLOGY | Age: 79
End: 2019-01-21

## 2019-01-21 ENCOUNTER — TELEPHONE (OUTPATIENT)
Dept: INTERNAL MEDICINE | Age: 79
End: 2019-01-21

## 2019-01-21 ENCOUNTER — NURSE ONLY (OUTPATIENT)
Dept: INTERNAL MEDICINE | Age: 79
End: 2019-01-21

## 2019-01-21 DIAGNOSIS — E53.8 B12 DEFICIENCY: Primary | ICD-10-CM

## 2019-01-21 PROCEDURE — 99211 OFF/OP EST MAY X REQ PHY/QHP: CPT | Performed by: INTERNAL MEDICINE

## 2019-01-21 RX ORDER — ALPRAZOLAM 0.25 MG/1
0.25 TABLET ORAL 2 TIMES DAILY PRN
Qty: 20 TABLET | Refills: 0
Start: 2019-01-21 | End: 2019-06-19 | Stop reason: ALTCHOICE

## 2019-01-23 ENCOUNTER — TELEPHONE (OUTPATIENT)
Dept: UROLOGY | Age: 79
End: 2019-01-23

## 2019-01-23 ENCOUNTER — LAB SERVICES (OUTPATIENT)
Dept: LAB | Age: 79
End: 2019-01-23

## 2019-01-23 DIAGNOSIS — N39.0 URINARY TRACT INFECTION WITHOUT HEMATURIA, SITE UNSPECIFIED: ICD-10-CM

## 2019-01-23 LAB
BACTERIA: ABNORMAL
BILIRUBIN URINE: NEGATIVE
BLOOD URINE: ABNORMAL
CLARITY: ABNORMAL
COLOR: YELLOW
GLUCOSE QUALITATIVE U: NEGATIVE
KETONES, URINE: NEGATIVE
LEUKOCYTE ESTERASE URINE: ABNORMAL
NITRITE URINE: POSITIVE
PH URINE: 7 (ref 5–7)
RED BLOOD CELLS URINE: ABNORMAL (ref 0–3)
SPECIFIC GRAVITY URINE: 1.01 (ref 1–1.03)
SQUAMOUS EPITHELIAL CELLS: ABNORMAL
URINE PROTEIN, QUAL (DIPSTICK): ABNORMAL
UROBILINOGEN URINE: ABNORMAL
WBC CLUMPS: ABNORMAL
WHITE BLOOD CELLS URINE: ABNORMAL (ref 0–5)

## 2019-01-23 PROCEDURE — 81001 URINALYSIS AUTO W/SCOPE: CPT | Performed by: UROLOGY

## 2019-01-23 PROCEDURE — 87086 URINE CULTURE/COLONY COUNT: CPT | Performed by: UROLOGY

## 2019-01-24 ENCOUNTER — IMAGING SERVICES (OUTPATIENT)
Dept: OTHER | Age: 79
End: 2019-01-24

## 2019-01-24 LAB — FINAL REPORT: NORMAL

## 2019-01-24 PROCEDURE — 96372 THER/PROPH/DIAG INJ SC/IM: CPT

## 2019-01-24 RX ADMIN — CYANOCOBALAMIN 1000 MCG: 1000 INJECTION, SOLUTION INTRAMUSCULAR; SUBCUTANEOUS at 14:40

## 2019-01-29 ENCOUNTER — EXTERNAL RECORD (OUTPATIENT)
Dept: UROLOGY | Age: 79
End: 2019-01-29

## 2019-01-29 LAB
ANION GAP: 6 MEQ/L (ref 8–16)
BLOOD UREA NITROGEN (BUN): 17 MG/DL (ref 7–25)
BUN/CREATININE RATIO: 13.5 (ref 7.4–23)
CALCIUM, SERUM: 7.9 MG/DL (ref 8.6–10.3)
CARBON DIOXIDE: 26 MMOL/L (ref 21–31)
CHLORIDE, SERUM: 107 MMOL/L (ref 98–107)
CREATININE: 1.26 MG/DL (ref 0.7–1.3)
EST GLOMERULAR FILTRATION RATE: 55 1.73M SQ
GLUCOSE: 166 MG/DL (ref 70–99)
HEMATOCRIT: 40 % (ref 38.6–49.2)
HEMOGLOBIN: 13.6 GM/DL (ref 13–17)
K (POTASSIUM, SERUM): 3.8 MMOL/L (ref 3.5–5.1)
NA (SODIUM, SERUM): 139 MMOL/L (ref 133–144)

## 2019-01-30 LAB
ANION GAP: 4 MEQ/L (ref 8–16)
BLOOD UREA NITROGEN (BUN): 15 MG/DL (ref 7–25)
BUN/CREATININE RATIO: 10.3 (ref 7.4–23)
CALCIUM, SERUM: 7.8 MG/DL (ref 8.6–10.3)
CARBON DIOXIDE: 27 MMOL/L (ref 21–31)
CHLORIDE, SERUM: 109 MMOL/L (ref 98–107)
CREATININE: 1.46 MG/DL (ref 0.7–1.3)
EST GLOMERULAR FILTRATION RATE: 47 1.73M SQ
GLUCOSE: 103 MG/DL (ref 70–99)
HEMATOCRIT: 33.9 % (ref 38.6–49.2)
HEMOGLOBIN: 11.6 GM/DL (ref 13–17)
K (POTASSIUM, SERUM): 4.2 MMOL/L (ref 3.5–5.1)
NA (SODIUM, SERUM): 140 MMOL/L (ref 133–144)

## 2019-02-03 LAB
*MEAN CORPUSCULAR HGB CONC: 34.3 G/DL (ref 32.5–35.8)
A/G RATIO: 1.38 (ref 1.1–2.4)
ALANINE AMINOTRANSFE: 21 U/L (ref 7–52)
ALBUMIN, SERUM (ALB): 4 G/DL (ref 3.5–5.7)
ALKALINE PHOSPHATASE (ALK): 57 U/L (ref 34–104)
ANION GAP: 8 MEQ/L (ref 8–16)
ASPARTATE AMINOTRANS: 19 U/L (ref 13–39)
BASOPHIL AUTOMATED: 0.4 %
BASOPHILS: 0 (ref 0–0.2)
BILIRUBIN, TOTAL: 0.9 MG/DL (ref 0.2–0.8)
BLOOD UREA NITROGEN (BUN): 18 MG/DL (ref 7–25)
BRAIN NATIURETIC PEPTIDE CHF: 33 PG/ML (ref 0–100)
BUN/CREATININE RATIO: 12 (ref 7.4–23)
CALCIUM, SERUM: 9.1 MG/DL (ref 8.6–10.3)
CARBON DIOXIDE: 28 MMOL/L (ref 21–31)
CHLORIDE, SERUM: 100 MMOL/L (ref 98–107)
CREATINE KINASE: 55 U/L (ref 30–223)
CREATININE: 1.5 MG/DL (ref 0.7–1.3)
EOSINOPHIL AUTOMATED: 5.9 %
EOSINOPHILS: 0.3 (ref 0–0.5)
EST GLOMERULAR FILTRATION RATE: 45 1.73M SQ
GLUCOSE: 120 MG/DL (ref 70–99)
HEMATOCRIT: 39.1 % (ref 38.6–49.2)
HEMOGLOBIN: 13.4 GM/DL (ref 13–17)
INTERNATIONAL NORMAL: 1.1 (ref 0.8–1.1)
K (POTASSIUM, SERUM): 4 MMOL/L (ref 3.5–5.1)
LYMPHOCYTE AUTOMATED: 13.4 %
LYMPHOCYTES: 0.7 (ref 0.6–3.4)
MEAN CORPUSCULAR HGB: 31.1 PG (ref 26–34)
MEAN CORPUSCULAR VOL: 90.7 FL (ref 80–100)
MEAN PLATELET VOLUME: 7.8 FL (ref 6.8–10.2)
MONOCYTE AUTOMATED: 8.5 %
MONOCYTES: 0.4 (ref 0.3–1)
NA (SODIUM, SERUM): 136 MMOL/L (ref 133–144)
NEUTROPHIL ABSOLUTE: 3.7 (ref 1.7–7.7)
NEUTROPHIL AUTOMATED: 71.8 %
PLATELET COUNT: 176 K/MM3 (ref 150–450)
PROTEIN TOTAL: 6.9 G/DL (ref 6.4–8.9)
PROTHROMBIN TIME (PATIENT): 12.6 SECONDS (ref 10.1–13.1)
PTT: 26 SECONDS (ref 25–36)
RED BLOOD CELL COUNT: 4.31 M/MM3 (ref 4.34–5.6)
RED CELL DISTRIBUTIO: 13.9 % (ref 11.9–15.9)
TROPONIN I (TROP): < 0.03 NG/ML
WHITE BLOOD CELL COU: 5.1 K/MM3 (ref 4–11)

## 2019-02-12 ENCOUNTER — OFFICE VISIT (OUTPATIENT)
Dept: UROLOGY | Age: 79
End: 2019-02-12

## 2019-02-12 VITALS — HEIGHT: 71 IN | TEMPERATURE: 98.2 F | WEIGHT: 179 LBS | BODY MASS INDEX: 25.06 KG/M2

## 2019-02-12 DIAGNOSIS — N40.1 BPH WITH OBSTRUCTION/LOWER URINARY TRACT SYMPTOMS: ICD-10-CM

## 2019-02-12 DIAGNOSIS — N13.8 BPH WITH OBSTRUCTION/LOWER URINARY TRACT SYMPTOMS: ICD-10-CM

## 2019-02-12 DIAGNOSIS — N11.9 CHRONIC PYELONEPHRITIS: ICD-10-CM

## 2019-02-12 DIAGNOSIS — N28.9 NON-FUNCTIONING KIDNEY: Primary | ICD-10-CM

## 2019-02-12 LAB — BLDR SCAN MLS: 119

## 2019-02-12 PROCEDURE — 51798 US URINE CAPACITY MEASURE: CPT | Performed by: UROLOGY

## 2019-02-12 PROCEDURE — 99024 POSTOP FOLLOW-UP VISIT: CPT | Performed by: UROLOGY

## 2019-02-22 ENCOUNTER — TELEPHONE (OUTPATIENT)
Dept: INTERNAL MEDICINE | Age: 79
End: 2019-02-22

## 2019-02-22 ENCOUNTER — NURSE ONLY (OUTPATIENT)
Dept: INTERNAL MEDICINE | Age: 79
End: 2019-02-22

## 2019-02-22 DIAGNOSIS — D51.0 VITAMIN B12 DEFICIENCY ANEMIA DUE TO INTRINSIC FACTOR DEFICIENCY: Primary | ICD-10-CM

## 2019-02-22 PROCEDURE — 96372 THER/PROPH/DIAG INJ SC/IM: CPT

## 2019-02-22 RX ADMIN — CYANOCOBALAMIN 1000 MCG: 1000 INJECTION, SOLUTION INTRAMUSCULAR; SUBCUTANEOUS at 10:59

## 2019-02-26 DIAGNOSIS — E53.8 VITAMIN B12 DEFICIENCY: Primary | ICD-10-CM

## 2019-02-26 DIAGNOSIS — D51.0 VITAMIN B12 DEFICIENCY ANEMIA DUE TO INTRINSIC FACTOR DEFICIENCY: ICD-10-CM

## 2019-02-26 RX ORDER — CYANOCOBALAMIN 1000 UG/ML
1000 INJECTION, SOLUTION INTRAMUSCULAR; SUBCUTANEOUS
Status: SHIPPED | OUTPATIENT
Start: 2019-03-22 | End: 2020-01-01

## 2019-03-05 VITALS — BODY MASS INDEX: 24.11 KG/M2 | TEMPERATURE: 97.9 F | WEIGHT: 178 LBS | HEIGHT: 72 IN

## 2019-03-05 VITALS
BODY MASS INDEX: 24.52 KG/M2 | TEMPERATURE: 97.6 F | SYSTOLIC BLOOD PRESSURE: 112 MMHG | OXYGEN SATURATION: 97 % | WEIGHT: 181 LBS | HEART RATE: 67 BPM | RESPIRATION RATE: 18 BRPM | HEIGHT: 72 IN | DIASTOLIC BLOOD PRESSURE: 74 MMHG

## 2019-03-05 VITALS — TEMPERATURE: 97.6 F | HEART RATE: 69 BPM | OXYGEN SATURATION: 99 % | RESPIRATION RATE: 20 BRPM

## 2019-03-05 VITALS — WEIGHT: 176 LBS | TEMPERATURE: 96.5 F | HEIGHT: 72 IN | BODY MASS INDEX: 23.84 KG/M2

## 2019-03-05 VITALS
SYSTOLIC BLOOD PRESSURE: 112 MMHG | DIASTOLIC BLOOD PRESSURE: 72 MMHG | TEMPERATURE: 98.4 F | WEIGHT: 184 LBS | BODY MASS INDEX: 24.92 KG/M2 | HEIGHT: 72 IN | RESPIRATION RATE: 16 BRPM | HEART RATE: 69 BPM | OXYGEN SATURATION: 97 %

## 2019-03-06 VITALS
WEIGHT: 186 LBS | DIASTOLIC BLOOD PRESSURE: 70 MMHG | RESPIRATION RATE: 16 BRPM | SYSTOLIC BLOOD PRESSURE: 110 MMHG | TEMPERATURE: 96.2 F | BODY MASS INDEX: 25.19 KG/M2 | HEIGHT: 72 IN | BODY MASS INDEX: 25.06 KG/M2 | HEART RATE: 58 BPM | TEMPERATURE: 96.6 F | SYSTOLIC BLOOD PRESSURE: 106 MMHG | WEIGHT: 185 LBS | DIASTOLIC BLOOD PRESSURE: 64 MMHG | HEIGHT: 72 IN | RESPIRATION RATE: 16 BRPM | HEART RATE: 57 BPM

## 2019-03-06 VITALS
DIASTOLIC BLOOD PRESSURE: 90 MMHG | OXYGEN SATURATION: 99 % | BODY MASS INDEX: 25.72 KG/M2 | HEART RATE: 77 BPM | SYSTOLIC BLOOD PRESSURE: 122 MMHG | WEIGHT: 187 LBS | TEMPERATURE: 99.1 F | RESPIRATION RATE: 19 BRPM

## 2019-03-06 VITALS
OXYGEN SATURATION: 95 % | WEIGHT: 185 LBS | SYSTOLIC BLOOD PRESSURE: 130 MMHG | TEMPERATURE: 96.9 F | HEART RATE: 54 BPM | BODY MASS INDEX: 25.06 KG/M2 | DIASTOLIC BLOOD PRESSURE: 80 MMHG | HEIGHT: 72 IN

## 2019-03-06 VITALS
OXYGEN SATURATION: 96 % | RESPIRATION RATE: 18 BRPM | SYSTOLIC BLOOD PRESSURE: 122 MMHG | BODY MASS INDEX: 24.92 KG/M2 | TEMPERATURE: 96.2 F | HEIGHT: 72 IN | DIASTOLIC BLOOD PRESSURE: 76 MMHG | HEART RATE: 61 BPM | WEIGHT: 184 LBS

## 2019-03-06 VITALS
TEMPERATURE: 97.4 F | SYSTOLIC BLOOD PRESSURE: 108 MMHG | HEART RATE: 55 BPM | OXYGEN SATURATION: 100 % | RESPIRATION RATE: 14 BRPM | DIASTOLIC BLOOD PRESSURE: 62 MMHG

## 2019-03-06 VITALS
HEART RATE: 66 BPM | DIASTOLIC BLOOD PRESSURE: 82 MMHG | SYSTOLIC BLOOD PRESSURE: 116 MMHG | TEMPERATURE: 98 F | RESPIRATION RATE: 16 BRPM | OXYGEN SATURATION: 99 %

## 2019-03-06 VITALS — WEIGHT: 177 LBS | BODY MASS INDEX: 24.78 KG/M2 | TEMPERATURE: 98.2 F | HEIGHT: 71 IN

## 2019-03-15 ENCOUNTER — OFFICE VISIT (OUTPATIENT)
Dept: PULMONOLOGY | Age: 79
End: 2019-03-15

## 2019-03-15 VITALS — BODY MASS INDEX: 25.2 KG/M2 | WEIGHT: 180 LBS | OXYGEN SATURATION: 93 % | HEIGHT: 71 IN | HEART RATE: 57 BPM

## 2019-03-15 DIAGNOSIS — G47.33 OBSTRUCTIVE SLEEP APNEA (ADULT) (PEDIATRIC): Primary | ICD-10-CM

## 2019-03-15 DIAGNOSIS — G47.52 RBD (REM BEHAVIORAL DISORDER): ICD-10-CM

## 2019-03-15 PROCEDURE — 99214 OFFICE O/P EST MOD 30 MIN: CPT | Performed by: INTERNAL MEDICINE

## 2019-03-15 RX ORDER — CLONAZEPAM 0.5 MG/1
1 TABLET ORAL AT BEDTIME
Qty: 60 TABLET | Refills: 0 | Status: SHIPPED | OUTPATIENT
Start: 2019-03-15 | End: 2019-06-19 | Stop reason: ALTCHOICE

## 2019-03-22 ENCOUNTER — NURSE ONLY (OUTPATIENT)
Dept: INTERNAL MEDICINE | Age: 79
End: 2019-03-22

## 2019-03-22 DIAGNOSIS — D51.9 ANEMIA DUE TO VITAMIN B12 DEFICIENCY, UNSPECIFIED B12 DEFICIENCY TYPE: Primary | ICD-10-CM

## 2019-03-22 PROCEDURE — 96372 THER/PROPH/DIAG INJ SC/IM: CPT | Performed by: INTERNAL MEDICINE

## 2019-03-22 RX ADMIN — CYANOCOBALAMIN 1000 MCG: 1000 INJECTION, SOLUTION INTRAMUSCULAR; SUBCUTANEOUS at 09:29

## 2019-04-22 ENCOUNTER — NURSE ONLY (OUTPATIENT)
Dept: INTERNAL MEDICINE | Age: 79
End: 2019-04-22

## 2019-04-22 DIAGNOSIS — E53.8 VITAMIN B12 DEFICIENCY: Primary | ICD-10-CM

## 2019-04-22 PROCEDURE — 96372 THER/PROPH/DIAG INJ SC/IM: CPT | Performed by: INTERNAL MEDICINE

## 2019-04-22 RX ADMIN — CYANOCOBALAMIN 1000 MCG: 1000 INJECTION, SOLUTION INTRAMUSCULAR; SUBCUTANEOUS at 09:44

## 2019-05-22 ENCOUNTER — NURSE ONLY (OUTPATIENT)
Dept: INTERNAL MEDICINE | Age: 79
End: 2019-05-22

## 2019-05-22 DIAGNOSIS — E53.8 VITAMIN B12 DEFICIENCY: Primary | ICD-10-CM

## 2019-05-22 PROCEDURE — 96372 THER/PROPH/DIAG INJ SC/IM: CPT | Performed by: INTERNAL MEDICINE

## 2019-05-22 RX ADMIN — CYANOCOBALAMIN 1000 MCG: 1000 INJECTION, SOLUTION INTRAMUSCULAR; SUBCUTANEOUS at 09:06

## 2019-05-30 RX ORDER — MECLIZINE HYDROCHLORIDE 25 MG/1
TABLET ORAL
Qty: 30 TABLET | Refills: 0 | Status: SHIPPED | OUTPATIENT
Start: 2019-05-30 | End: 2019-01-01 | Stop reason: SDUPTHER

## 2019-06-19 ENCOUNTER — OFFICE VISIT (OUTPATIENT)
Dept: INTERNAL MEDICINE | Age: 79
End: 2019-06-19

## 2019-06-19 ENCOUNTER — LAB SERVICES (OUTPATIENT)
Dept: LAB | Age: 79
End: 2019-06-19

## 2019-06-19 VITALS
HEIGHT: 71 IN | SYSTOLIC BLOOD PRESSURE: 116 MMHG | OXYGEN SATURATION: 97 % | DIASTOLIC BLOOD PRESSURE: 70 MMHG | TEMPERATURE: 97.7 F | HEART RATE: 61 BPM | BODY MASS INDEX: 25.34 KG/M2 | WEIGHT: 181 LBS

## 2019-06-19 DIAGNOSIS — E53.8 B12 DEFICIENCY: ICD-10-CM

## 2019-06-19 DIAGNOSIS — G47.33 SLEEP APNEA, OBSTRUCTIVE: ICD-10-CM

## 2019-06-19 DIAGNOSIS — N40.1 BPH WITH OBSTRUCTION/LOWER URINARY TRACT SYMPTOMS: ICD-10-CM

## 2019-06-19 DIAGNOSIS — I25.10 ATHEROSCLEROSIS OF NATIVE CORONARY ARTERY OF NATIVE HEART WITHOUT ANGINA PECTORIS: ICD-10-CM

## 2019-06-19 DIAGNOSIS — N18.30 STAGE 3 CHRONIC KIDNEY DISEASE (CMD): ICD-10-CM

## 2019-06-19 DIAGNOSIS — E78.5 HYPERLIPIDEMIA, UNSPECIFIED HYPERLIPIDEMIA TYPE: ICD-10-CM

## 2019-06-19 DIAGNOSIS — C69.31 CHOROIDAL MALIGNANT MELANOMA, RIGHT (CMD): ICD-10-CM

## 2019-06-19 DIAGNOSIS — E78.5 HYPERLIPIDEMIA, UNSPECIFIED HYPERLIPIDEMIA TYPE: Primary | ICD-10-CM

## 2019-06-19 DIAGNOSIS — Z00.00 ANNUAL PHYSICAL EXAM: ICD-10-CM

## 2019-06-19 DIAGNOSIS — N13.8 BPH WITH OBSTRUCTION/LOWER URINARY TRACT SYMPTOMS: ICD-10-CM

## 2019-06-19 LAB
ALBUMIN SERPL BCG-MCNC: 4.5 G/DL (ref 3.6–5.1)
ALP SERPL-CCNC: 95 U/L (ref 45–115)
ALT SERPL W/O P-5'-P-CCNC: 22 U/L (ref 10–35)
AST SERPL-CCNC: 26 U/L (ref 9–37)
BILIRUB SERPL-MCNC: 0.5 MG/DL (ref 0–1)
BUN SERPL-MCNC: 21 MG/DL (ref 6–27)
CALCIUM SERPL-MCNC: 9.9 MG/DL (ref 8.6–10.6)
CHLORIDE SERPL-SCNC: 104 MMOL/L (ref 96–107)
CHOLEST SERPL-MCNC: 142 MG/DL (ref 140–200)
CREAT SERPL-MCNC: 1.6 MG/DL (ref 0.6–1.6)
GFR SERPL CREATININE-BSD FRML MDRD: 41 ML/MIN/{1.73M2}
GFR SERPL CREATININE-BSD FRML MDRD: 49 ML/MIN/{1.73M2}
GLUCOSE P FAST SERPL-MCNC: 105 MG/DL (ref 60–100)
HCO3 SERPL-SCNC: 29 MMOL/L (ref 22–32)
HDLC SERPL-MCNC: 42 MG/DL
LDLC SERPL CALC-MCNC: 66 MG/DL (ref 0–100)
POTASSIUM SERPL-SCNC: 4.7 MMOL/L (ref 3.5–5.3)
PROT SERPL-MCNC: 6.9 G/DL (ref 6.2–8.1)
SODIUM SERPL-SCNC: 139 MMOL/L (ref 136–146)
TRIGL SERPL-MCNC: 171 MG/DL (ref 0–200)

## 2019-06-19 PROCEDURE — 80053 COMPREHEN METABOLIC PANEL: CPT | Performed by: INTERNAL MEDICINE

## 2019-06-19 PROCEDURE — 36415 COLL VENOUS BLD VENIPUNCTURE: CPT | Performed by: INTERNAL MEDICINE

## 2019-06-19 PROCEDURE — G0439 PPPS, SUBSEQ VISIT: HCPCS | Performed by: INTERNAL MEDICINE

## 2019-06-19 PROCEDURE — 82607 VITAMIN B-12: CPT | Performed by: INTERNAL MEDICINE

## 2019-06-19 PROCEDURE — 80061 LIPID PANEL: CPT | Performed by: INTERNAL MEDICINE

## 2019-06-19 RX ORDER — ASPIRIN 81 MG/1
1 TABLET, CHEWABLE ORAL DAILY
COMMUNITY

## 2019-06-19 ASSESSMENT — ENCOUNTER SYMPTOMS
LIGHT-HEADEDNESS: 0
POLYPHAGIA: 0
ABDOMINAL PAIN: 0
ACTIVITY CHANGE: 0
FACIAL ASYMMETRY: 0
SPEECH DIFFICULTY: 0
CHOKING: 0
DIAPHORESIS: 0
EYE REDNESS: 0
CONSTIPATION: 0
NERVOUS/ANXIOUS: 0
TREMORS: 0
EYE PAIN: 0
TROUBLE SWALLOWING: 0
UNEXPECTED WEIGHT CHANGE: 0
FEVER: 0
BLOOD IN STOOL: 0
VOICE CHANGE: 0
SHORTNESS OF BREATH: 0
APNEA: 0
ABDOMINAL DISTENTION: 0
NAUSEA: 0
WEAKNESS: 0
DIZZINESS: 0
POLYDIPSIA: 0
ADENOPATHY: 0
PHOTOPHOBIA: 0
NUMBNESS: 0
CHEST TIGHTNESS: 0
FATIGUE: 0
CONFUSION: 0
VOMITING: 0
RECTAL PAIN: 0
HEADACHES: 0
CHILLS: 0
BACK PAIN: 0
DIARRHEA: 0
SLEEP DISTURBANCE: 0

## 2019-06-20 LAB — VIT B12 SERPL-MCNC: >1000 PG/ML (ref 193–982)

## 2019-06-21 ENCOUNTER — TELEPHONE (OUTPATIENT)
Dept: INTERNAL MEDICINE | Age: 79
End: 2019-06-21

## 2019-06-21 ENCOUNTER — APPOINTMENT (OUTPATIENT)
Dept: INTERNAL MEDICINE | Age: 79
End: 2019-06-21

## 2019-06-28 ENCOUNTER — TELEPHONE (OUTPATIENT)
Dept: INTERNAL MEDICINE | Age: 79
End: 2019-06-28

## 2019-07-16 ENCOUNTER — LAB SERVICES (OUTPATIENT)
Dept: LAB | Age: 79
End: 2019-07-16

## 2019-07-16 ENCOUNTER — OFFICE VISIT (OUTPATIENT)
Dept: HEMATOLOGY/ONCOLOGY | Age: 79
End: 2019-07-16

## 2019-07-16 DIAGNOSIS — C69.31 CHOROIDAL MALIGNANT MELANOMA, RIGHT (CMD): ICD-10-CM

## 2019-07-16 DIAGNOSIS — C69.31 CHOROIDAL MALIGNANT MELANOMA, RIGHT (CMD): Primary | ICD-10-CM

## 2019-07-16 LAB
ALBUMIN SERPL BCG-MCNC: 4.6 G/DL (ref 3.6–5.1)
ALP SERPL-CCNC: 104 U/L (ref 45–115)
ALT SERPL W/O P-5'-P-CCNC: 18 U/L (ref 10–35)
AST SERPL-CCNC: 22 U/L (ref 9–37)
BILIRUB SERPL-MCNC: 0.5 MG/DL (ref 0–1)
BUN SERPL-MCNC: 23 MG/DL (ref 6–27)
CALCIUM SERPL-MCNC: 10 MG/DL (ref 8.6–10.6)
CHLORIDE SERPL-SCNC: 103 MMOL/L (ref 96–107)
CREAT SERPL-MCNC: 1.6 MG/DL (ref 0.6–1.6)
DIFFERENTIAL TYPE: ABNORMAL
GFR SERPL CREATININE-BSD FRML MDRD: 42 ML/MIN/{1.73M2}
GFR SERPL CREATININE-BSD FRML MDRD: 51 ML/MIN/{1.73M2}
GLUCOSE SERPL-MCNC: 106 MG/DL (ref 70–200)
HCO3 SERPL-SCNC: 29 MMOL/L (ref 22–32)
HEMATOCRIT: 41.9 % (ref 40–51)
HEMOGLOBIN: 14.2 G/DL (ref 13.7–17.5)
LDH SERPL P TO L-CCNC: 511 U/L (ref 313–618)
LYMPH PERCENT: 20.1 % (ref 20.5–51.1)
LYMPHOCYTE ABSOLUTE #: 1.1 10*3/UL (ref 1.2–3.4)
MEAN CORPUSCULAR HGB CONCENTRATION: 33.9 % (ref 32–36)
MEAN CORPUSCULAR HGB: 31.1 PG (ref 27–34)
MEAN CORPUSCULAR VOLUME: 91.9 FL (ref 79–95)
MEAN PLATELET VOLUME: 9.5 FL (ref 8.6–12.4)
MIXED %: 10.9 % (ref 4.3–12.9)
MIXED ABSOLUTE #: 0.6 10*3/UL (ref 0.2–0.9)
NEUTROPHIL ABSOLUTE #: 3.6 10*3/UL (ref 1.4–6.5)
NEUTROPHIL PERCENT: 69 % (ref 34–73.5)
PLATELET COUNT: 148 10*3/UL (ref 150–400)
POTASSIUM SERPL-SCNC: 4.6 MMOL/L (ref 3.5–5.3)
PROT SERPL-MCNC: 7.4 G/DL (ref 6.2–8.1)
RED BLOOD CELL COUNT: 4.56 10*6/UL (ref 3.9–5.7)
RED CELL DISTRIBUTION WIDTH: 12.8 % (ref 11.3–14.8)
SODIUM SERPL-SCNC: 142 MMOL/L (ref 136–146)
WHITE BLOOD CELL COUNT: 5.3 10*3/UL (ref 4–10)

## 2019-07-16 PROCEDURE — 36415 COLL VENOUS BLD VENIPUNCTURE: CPT | Performed by: INTERNAL MEDICINE

## 2019-07-16 PROCEDURE — 83615 LACTATE (LD) (LDH) ENZYME: CPT | Performed by: INTERNAL MEDICINE

## 2019-07-16 PROCEDURE — 85025 COMPLETE CBC W/AUTO DIFF WBC: CPT | Performed by: INTERNAL MEDICINE

## 2019-07-16 PROCEDURE — 80053 COMPREHEN METABOLIC PANEL: CPT | Performed by: INTERNAL MEDICINE

## 2019-07-16 PROCEDURE — 99214 OFFICE O/P EST MOD 30 MIN: CPT | Performed by: INTERNAL MEDICINE

## 2019-07-16 SDOH — HEALTH STABILITY: MENTAL HEALTH: HOW OFTEN DO YOU HAVE A DRINK CONTAINING ALCOHOL?: NEVER

## 2019-07-16 ASSESSMENT — PAIN SCALES - GENERAL: PAINLEVEL: 0

## 2019-07-20 ENCOUNTER — WALK IN (OUTPATIENT)
Dept: URGENT CARE | Age: 79
End: 2019-07-20

## 2019-07-20 VITALS
DIASTOLIC BLOOD PRESSURE: 76 MMHG | HEART RATE: 75 BPM | RESPIRATION RATE: 18 BRPM | OXYGEN SATURATION: 98 % | TEMPERATURE: 97.8 F | SYSTOLIC BLOOD PRESSURE: 122 MMHG

## 2019-07-20 DIAGNOSIS — J06.9 VIRAL UPPER RESPIRATORY TRACT INFECTION: Primary | ICD-10-CM

## 2019-07-20 PROCEDURE — 99214 OFFICE O/P EST MOD 30 MIN: CPT | Performed by: INTERNAL MEDICINE

## 2019-07-20 RX ORDER — AZITHROMYCIN 250 MG/1
TABLET, FILM COATED ORAL
Qty: 6 TABLET | Refills: 0 | Status: SHIPPED | OUTPATIENT
Start: 2019-07-20 | End: 2019-07-25

## 2019-07-22 ENCOUNTER — NURSE ONLY (OUTPATIENT)
Dept: INTERNAL MEDICINE | Age: 79
End: 2019-07-22

## 2019-07-22 DIAGNOSIS — E53.8 B12 DEFICIENCY: ICD-10-CM

## 2019-07-22 PROCEDURE — 96372 THER/PROPH/DIAG INJ SC/IM: CPT | Performed by: INTERNAL MEDICINE

## 2019-07-22 RX ADMIN — CYANOCOBALAMIN 1000 MCG: 1000 INJECTION, SOLUTION INTRAMUSCULAR; SUBCUTANEOUS at 09:14

## 2019-08-19 ENCOUNTER — NURSE ONLY (OUTPATIENT)
Dept: INTERNAL MEDICINE | Age: 79
End: 2019-08-19

## 2019-08-19 DIAGNOSIS — E53.8 B12 DEFICIENCY: ICD-10-CM

## 2019-08-19 PROCEDURE — 96372 THER/PROPH/DIAG INJ SC/IM: CPT | Performed by: INTERNAL MEDICINE

## 2019-08-19 RX ADMIN — CYANOCOBALAMIN 1000 MCG: 1000 INJECTION, SOLUTION INTRAMUSCULAR; SUBCUTANEOUS at 09:02

## 2019-11-04 PROBLEM — G47.52 RBD (REM BEHAVIORAL DISORDER): Status: ACTIVE | Noted: 2019-01-01

## 2020-01-01 ENCOUNTER — APPOINTMENT (OUTPATIENT)
Dept: CT IMAGING | Age: 80
End: 2020-01-01
Attending: INTERNAL MEDICINE

## 2020-01-01 ENCOUNTER — TELEPHONE (OUTPATIENT)
Dept: HEMATOLOGY/ONCOLOGY | Age: 80
End: 2020-01-01

## 2020-01-01 ENCOUNTER — TELEPHONE (OUTPATIENT)
Dept: UROLOGY | Age: 80
End: 2020-01-01

## 2020-01-01 ENCOUNTER — IMAGING SERVICES (OUTPATIENT)
Dept: GENERAL RADIOLOGY | Age: 80
End: 2020-01-01
Attending: INTERNAL MEDICINE

## 2020-01-01 ENCOUNTER — TELEPHONE (OUTPATIENT)
Dept: INTERNAL MEDICINE | Age: 80
End: 2020-01-01

## 2020-01-01 ENCOUNTER — TELEPHONE (OUTPATIENT)
Dept: FAMILY MEDICINE | Age: 80
End: 2020-01-01

## 2020-01-01 ENCOUNTER — HOSPITAL (OUTPATIENT)
Dept: OTHER | Age: 80
End: 2020-01-01
Attending: INTERNAL MEDICINE

## 2020-01-01 ENCOUNTER — APPOINTMENT (OUTPATIENT)
Dept: INTERNAL MEDICINE | Age: 80
End: 2020-01-01

## 2020-01-01 ENCOUNTER — OFFICE VISIT (OUTPATIENT)
Dept: INTERNAL MEDICINE | Age: 80
End: 2020-01-01

## 2020-01-01 ENCOUNTER — APPOINTMENT (OUTPATIENT)
Dept: HEMATOLOGY/ONCOLOGY | Age: 80
End: 2020-01-01

## 2020-01-01 ENCOUNTER — NURSE ONLY (OUTPATIENT)
Dept: INTERNAL MEDICINE | Age: 80
End: 2020-01-01

## 2020-01-01 ENCOUNTER — TELEPHONE (OUTPATIENT)
Dept: PULMONOLOGY | Age: 80
End: 2020-01-01

## 2020-01-01 ENCOUNTER — TELEPHONE (OUTPATIENT)
Dept: CT IMAGING | Age: 80
End: 2020-01-01

## 2020-01-01 ENCOUNTER — LAB SERVICES (OUTPATIENT)
Dept: LAB | Age: 80
End: 2020-01-01

## 2020-01-01 ENCOUNTER — OFFICE VISIT (OUTPATIENT)
Dept: HEMATOLOGY/ONCOLOGY | Age: 80
End: 2020-01-01

## 2020-01-01 ENCOUNTER — OFFICE VISIT (OUTPATIENT)
Dept: UROLOGY | Age: 80
End: 2020-01-01

## 2020-01-01 ENCOUNTER — HOSPITAL (OUTPATIENT)
Dept: OTHER | Age: 80
End: 2020-01-01

## 2020-01-01 ENCOUNTER — EXTERNAL RECORD (OUTPATIENT)
Dept: HEALTH INFORMATION MANAGEMENT | Facility: OTHER | Age: 80
End: 2020-01-01

## 2020-01-01 ENCOUNTER — IMAGING SERVICES (OUTPATIENT)
Dept: CT IMAGING | Age: 80
End: 2020-01-01
Attending: INTERNAL MEDICINE

## 2020-01-01 ENCOUNTER — IMAGING SERVICES (OUTPATIENT)
Dept: PET IMAGING | Age: 80
End: 2020-01-01
Attending: INTERNAL MEDICINE

## 2020-01-01 ENCOUNTER — OFF PREMISE CHARGES (OUTPATIENT)
Dept: HEMATOLOGY/ONCOLOGY | Age: 80
End: 2020-01-01

## 2020-01-01 VITALS
WEIGHT: 182 LBS | TEMPERATURE: 97.8 F | DIASTOLIC BLOOD PRESSURE: 70 MMHG | SYSTOLIC BLOOD PRESSURE: 118 MMHG | OXYGEN SATURATION: 94 % | HEART RATE: 60 BPM | HEIGHT: 71 IN | RESPIRATION RATE: 17 BRPM | BODY MASS INDEX: 25.48 KG/M2

## 2020-01-01 VITALS
BODY MASS INDEX: 22.96 KG/M2 | OXYGEN SATURATION: 97 % | TEMPERATURE: 98.1 F | HEART RATE: 82 BPM | HEIGHT: 71 IN | WEIGHT: 164 LBS | RESPIRATION RATE: 18 BRPM | DIASTOLIC BLOOD PRESSURE: 60 MMHG | SYSTOLIC BLOOD PRESSURE: 112 MMHG

## 2020-01-01 VITALS — HEIGHT: 71 IN | WEIGHT: 178 LBS | BODY MASS INDEX: 24.92 KG/M2 | TEMPERATURE: 97 F

## 2020-01-01 DIAGNOSIS — N40.1 BPH WITH OBSTRUCTION/LOWER URINARY TRACT SYMPTOMS: Primary | ICD-10-CM

## 2020-01-01 DIAGNOSIS — C69.31 CHOROIDAL MALIGNANT MELANOMA, RIGHT (CMD): Primary | ICD-10-CM

## 2020-01-01 DIAGNOSIS — C69.31 CHOROIDAL MALIGNANT MELANOMA, RIGHT (CMD): ICD-10-CM

## 2020-01-01 DIAGNOSIS — L30.9 DERMATITIS: Primary | ICD-10-CM

## 2020-01-01 DIAGNOSIS — I25.10 ATHEROSCLEROSIS OF NATIVE CORONARY ARTERY OF NATIVE HEART WITHOUT ANGINA PECTORIS: Primary | ICD-10-CM

## 2020-01-01 DIAGNOSIS — E53.8 B12 DEFICIENCY: Primary | ICD-10-CM

## 2020-01-01 DIAGNOSIS — E53.8 B12 DEFICIENCY: ICD-10-CM

## 2020-01-01 DIAGNOSIS — G47.33 SLEEP APNEA, OBSTRUCTIVE: ICD-10-CM

## 2020-01-01 DIAGNOSIS — E78.5 HYPERLIPIDEMIA, UNSPECIFIED HYPERLIPIDEMIA TYPE: ICD-10-CM

## 2020-01-01 DIAGNOSIS — R94.5 ABNORMAL RESULTS OF LIVER FUNCTION STUDIES: ICD-10-CM

## 2020-01-01 DIAGNOSIS — E53.8 VITAMIN B12 DEFICIENCY: Primary | ICD-10-CM

## 2020-01-01 DIAGNOSIS — R79.1 ABNORMAL COAGULATION PROFILE: ICD-10-CM

## 2020-01-01 DIAGNOSIS — N18.30 STAGE 3 CHRONIC KIDNEY DISEASE (CMD): ICD-10-CM

## 2020-01-01 DIAGNOSIS — N13.8 BPH WITH OBSTRUCTION/LOWER URINARY TRACT SYMPTOMS: Primary | ICD-10-CM

## 2020-01-01 LAB
*MEAN CORPUSCULAR HGB CONC: 33.7 G/DL (ref 32.5–35.8)
A/G RATIO: 1.18 (ref 1.1–2.4)
ALANINE AMINOTRANSFE: 47 U/L (ref 7–52)
ALBUMIN SERPL BCG-MCNC: 3.9 G/DL (ref 3.6–5.1)
ALBUMIN SERPL BCG-MCNC: 4.1 G/DL (ref 3.6–5.1)
ALBUMIN SERPL BCG-MCNC: 4.2 G/DL (ref 3.6–5.1)
ALBUMIN SERPL BCG-MCNC: 4.8 G/DL (ref 3.6–5.1)
ALBUMIN, SERUM (ALB): 4 G/DL (ref 3.5–5.7)
ALKALINE PHOSPHATASE (ALK): 106 U/L (ref 34–104)
ALP SERPL-CCNC: 109 U/L (ref 45–115)
ALP SERPL-CCNC: 130 U/L (ref 45–115)
ALP SERPL-CCNC: 135 U/L (ref 45–115)
ALP SERPL-CCNC: 142 U/L (ref 45–115)
ALT SERPL W/O P-5'-P-CCNC: 22 U/L (ref 10–35)
ALT SERPL W/O P-5'-P-CCNC: 25 U/L (ref 10–35)
ALT SERPL W/O P-5'-P-CCNC: 26 U/L (ref 10–35)
ALT SERPL W/O P-5'-P-CCNC: 34 U/L (ref 10–35)
ANION GAP: 8 MEQ/L (ref 6.2–14.7)
APPEARANCE: ABNORMAL
ASCORBIC ACID COMMENT, URINE: NORMAL
ASPARTATE AMINOTRANS: 37 U/L (ref 13–39)
AST SERPL-CCNC: 24 U/L (ref 9–37)
AST SERPL-CCNC: 25 U/L (ref 9–37)
AST SERPL-CCNC: 27 U/L (ref 9–37)
AST SERPL-CCNC: 34 U/L (ref 9–37)
BACTERIA: ABNORMAL /HPF
BASOPHIL AUTOMATED: 0.3 %
BASOPHILS: 0 (ref 0–0.2)
BILIRUB SERPL-MCNC: 0.4 MG/DL (ref 0–1)
BILIRUB SERPL-MCNC: 0.5 MG/DL (ref 0–1)
BILIRUBIN, TOTAL: 0.5 MG/DL (ref 0.2–0.8)
BILIRUBIN: ABNORMAL
BLDR SCAN MLS: 29
BLOOD UREA NITROGEN (BUN): 26 MG/DL (ref 7–25)
BUN SERPL-MCNC: 23 MG/DL (ref 6–27)
BUN SERPL-MCNC: 23 MG/DL (ref 6–27)
BUN SERPL-MCNC: 24 MG/DL (ref 6–27)
BUN SERPL-MCNC: 25 MG/DL (ref 6–27)
BUN/CREATININE RATIO: 18.8 (ref 7.4–23)
CALCIUM SERPL-MCNC: 9.1 MG/DL (ref 8.6–10.6)
CALCIUM SERPL-MCNC: 9.7 MG/DL (ref 8.6–10.6)
CALCIUM SERPL-MCNC: 9.8 MG/DL (ref 8.6–10.6)
CALCIUM SERPL-MCNC: 9.8 MG/DL (ref 8.6–10.6)
CALCIUM, SERUM: 9.3 MG/DL (ref 8.6–10.3)
CARBON DIOXIDE: 28 MMOL/L (ref 21–31)
CHLORIDE SERPL-SCNC: 100 MMOL/L (ref 96–107)
CHLORIDE SERPL-SCNC: 102 MMOL/L (ref 96–107)
CHLORIDE SERPL-SCNC: 103 MMOL/L (ref 96–107)
CHLORIDE SERPL-SCNC: 103 MMOL/L (ref 96–107)
CHLORIDE, SERUM: 102 MMOL/L (ref 98–107)
COLOR: YELLOW
COVID-19 RAPID RESULT: NEGATIVE
COVID-19 RAPID SOURCE: NORMAL
CREAT SERPL-MCNC: 1.4 MG/DL (ref 0.6–1.6)
CREAT SERPL-MCNC: 1.5 MG/DL (ref 0.6–1.6)
CREATINE KINASE: 47 U/L (ref 30–223)
CREATININE: 1.38 MG/DL (ref 0.7–1.3)
CULTURE REFLEX COMMENT: YES
DIFFERENTIAL TYPE: ABNORMAL
EOSINOPHIL AUTOMATED: 1.6 %
EOSINOPHILS: 0.1 (ref 0–0.5)
EST GLOMERULAR FILTRATION RATE: 50 1.73M SQ
GFR SERPL CREATININE-BSD FRML MDRD: 45 ML/MIN/{1.73M2}
GFR SERPL CREATININE-BSD FRML MDRD: 48 ML/MIN/{1.73M2}
GFR SERPL CREATININE-BSD FRML MDRD: 48 ML/MIN/{1.73M2}
GFR SERPL CREATININE-BSD FRML MDRD: 50 ML/MIN/{1.73M2}
GFR SERPL CREATININE-BSD FRML MDRD: 55 ML/MIN/{1.73M2}
GFR SERPL CREATININE-BSD FRML MDRD: 58 ML/MIN/{1.73M2}
GFR SERPL CREATININE-BSD FRML MDRD: 58 ML/MIN/{1.73M2}
GFR SERPL CREATININE-BSD FRML MDRD: >60 ML/MIN/{1.73M2}
GLUCOSE SERPL-MCNC: 124 MG/DL (ref 70–200)
GLUCOSE SERPL-MCNC: 154 MG/DL (ref 70–200)
GLUCOSE SERPL-MCNC: 195 MG/DL (ref 70–200)
GLUCOSE SERPL-MCNC: 89 MG/DL (ref 70–200)
GLUCOSE, URINE, AUTOMATED: ABNORMAL MG/DL
GLUCOSE: 139 MG/DL (ref 70–99)
HCO3 SERPL-SCNC: 26 MMOL/L (ref 22–32)
HCO3 SERPL-SCNC: 26 MMOL/L (ref 22–32)
HCO3 SERPL-SCNC: 27 MMOL/L (ref 22–32)
HCO3 SERPL-SCNC: 29 MMOL/L (ref 22–32)
HEMATOCRIT: 37.7 % (ref 38.6–49.2)
HEMATOCRIT: 37.7 % (ref 40–51)
HEMATOCRIT: 37.8 % (ref 40–51)
HEMATOCRIT: 39.1 % (ref 40–51)
HEMATOCRIT: 39.5 % (ref 40–51)
HEMATOCRIT: 43.8 % (ref 40–51)
HEMOGLOBIN: 12.3 G/DL (ref 13.7–17.5)
HEMOGLOBIN: 12.4 G/DL (ref 13.7–17.5)
HEMOGLOBIN: 12.7 GM/DL (ref 13–17)
HEMOGLOBIN: 13.2 G/DL (ref 13.7–17.5)
HEMOGLOBIN: 13.3 G/DL (ref 13.7–17.5)
HEMOGLOBIN: 14.6 G/DL (ref 13.7–17.5)
INTERNATIONAL NORMALIZED RATIO: 1
K (POTASSIUM, SERUM): 3.8 MMOL/L (ref 3.5–5.1)
KETONES, URINE, AUTOMATED: ABNORMAL MG/DL
LACTIC ACID: 0.7 MMOL/L (ref 0.5–2)
LDH SERPL P TO L-CCNC: 1437 U/L (ref 313–618)
LDH SERPL P TO L-CCNC: 727 U/L (ref 313–618)
LDH SERPL P TO L-CCNC: 915 U/L (ref 313–618)
LEUKOCYTE, URINE, AUTOMATED: ABNORMAL
LYMPH PERCENT: 14 % (ref 20.5–51.1)
LYMPH PERCENT: 15.6 % (ref 20.5–51.1)
LYMPH PERCENT: 17.6 % (ref 20.5–51.1)
LYMPH PERCENT: 20.8 % (ref 20.5–51.1)
LYMPH PERCENT: 22.2 % (ref 20.5–51.1)
LYMPHOCYTE ABSOLUTE #: 1 10*3/UL (ref 1.2–3.4)
LYMPHOCYTE ABSOLUTE #: 1 10*3/UL (ref 1.2–3.4)
LYMPHOCYTE ABSOLUTE #: 1.1 10*3/UL (ref 1.2–3.4)
LYMPHOCYTE ABSOLUTE #: 1.2 10*3/UL (ref 1.2–3.4)
LYMPHOCYTE ABSOLUTE #: 1.2 10*3/UL (ref 1.2–3.4)
LYMPHOCYTE AUTOMATED: 12.3 %
LYMPHOCYTES: 0.8 (ref 0.6–3.4)
MEAN CORPUSCULAR HGB CONCENTRATION: 32.6 % (ref 32–36)
MEAN CORPUSCULAR HGB CONCENTRATION: 32.8 % (ref 32–36)
MEAN CORPUSCULAR HGB CONCENTRATION: 33.3 % (ref 32–36)
MEAN CORPUSCULAR HGB CONCENTRATION: 33.7 % (ref 32–36)
MEAN CORPUSCULAR HGB CONCENTRATION: 33.8 % (ref 32–36)
MEAN CORPUSCULAR HGB: 29.8 PG (ref 27–34)
MEAN CORPUSCULAR HGB: 30.1 PG (ref 27–34)
MEAN CORPUSCULAR HGB: 30.2 PG (ref 26–34)
MEAN CORPUSCULAR HGB: 30.7 PG (ref 27–34)
MEAN CORPUSCULAR VOL: 89.7 FL (ref 80–100)
MEAN CORPUSCULAR VOLUME: 90.9 FL (ref 79–95)
MEAN CORPUSCULAR VOLUME: 91.2 FL (ref 79–95)
MEAN CORPUSCULAR VOLUME: 91.3 FL (ref 79–95)
MEAN CORPUSCULAR VOLUME: 91.7 FL (ref 79–95)
MEAN CORPUSCULAR VOLUME: 92 FL (ref 79–95)
MEAN PLATELET VOLUME: 8 FL (ref 8.6–12.4)
MEAN PLATELET VOLUME: 8.3 FL (ref 6.8–10.2)
MEAN PLATELET VOLUME: 9 FL (ref 8.6–12.4)
MEAN PLATELET VOLUME: 9.2 FL (ref 8.6–12.4)
MEAN PLATELET VOLUME: 9.2 FL (ref 8.6–12.4)
MEAN PLATELET VOLUME: 9.5 FL (ref 8.6–12.4)
MG (MAGNESIUM, SERUM: 2.2 MG/DL (ref 1.6–2.6)
MIXED %: 11 % (ref 4.3–12.9)
MIXED %: 4.8 % (ref 4.3–12.9)
MIXED %: 6.9 % (ref 4.3–12.9)
MIXED %: 9 % (ref 4.3–12.9)
MIXED %: 9.9 % (ref 4.3–12.9)
MIXED ABSOLUTE #: 0.3 10*3/UL (ref 0.2–0.9)
MIXED ABSOLUTE #: 0.4 10*3/UL (ref 0.2–0.9)
MIXED ABSOLUTE #: 0.5 10*3/UL (ref 0.2–0.9)
MIXED ABSOLUTE #: 0.7 10*3/UL (ref 0.2–0.9)
MIXED ABSOLUTE #: 0.8 10*3/UL (ref 0.2–0.9)
MONOCYTE AUTOMATED: 6.1 %
MONOCYTES: 0.4 (ref 0.3–1)
NA (SODIUM, SERUM): 138 MMOL/L (ref 133–144)
NEUTROPHIL ABSOLUTE #: 3.6 10*3/UL (ref 1.4–6.5)
NEUTROPHIL ABSOLUTE #: 4 10*3/UL (ref 1.4–6.5)
NEUTROPHIL ABSOLUTE #: 4.1 10*3/UL (ref 1.4–6.5)
NEUTROPHIL ABSOLUTE #: 5.2 10*3/UL (ref 1.4–6.5)
NEUTROPHIL ABSOLUTE #: 5.8 10*3/UL (ref 1.4–6.5)
NEUTROPHIL ABSOLUTE: 5.5 (ref 1.7–7.7)
NEUTROPHIL AUTOMATED: 79.7 %
NEUTROPHIL PERCENT: 70.9 % (ref 34–73.5)
NEUTROPHIL PERCENT: 72.5 % (ref 34–73.5)
NEUTROPHIL PERCENT: 74.4 % (ref 34–73.5)
NEUTROPHIL PERCENT: 75 % (ref 34–73.5)
NEUTROPHIL PERCENT: 75.4 % (ref 34–73.5)
NITRITE, URINE AUTOMATED: POSITIVE
PH, URINE: 6 (ref 5–8)
PLATELET COUNT: 152 10*3/UL (ref 150–400)
PLATELET COUNT: 195 10*3/UL (ref 150–400)
PLATELET COUNT: 215 10*3/UL (ref 150–400)
PLATELET COUNT: 225 10*3/UL (ref 150–400)
PLATELET COUNT: 245 K/MM3 (ref 150–450)
PLATELET COUNT: 292 10*3/UL (ref 150–400)
POTASSIUM SERPL-SCNC: 3.8 MMOL/L (ref 3.5–5.3)
POTASSIUM SERPL-SCNC: 4.1 MMOL/L (ref 3.5–5.3)
POTASSIUM SERPL-SCNC: 4.2 MMOL/L (ref 3.5–5.3)
POTASSIUM SERPL-SCNC: 4.5 MMOL/L (ref 3.5–5.3)
PROT SERPL-MCNC: 7.1 G/DL (ref 6.2–8.1)
PROT SERPL-MCNC: 7.4 G/DL (ref 6.2–8.1)
PROT SERPL-MCNC: 7.4 G/DL (ref 6.2–8.1)
PROT SERPL-MCNC: 7.9 G/DL (ref 6.2–8.1)
PROTEIN TOTAL: 7.4 G/DL (ref 6.4–8.9)
PROTEIN, URINE: ABNORMAL MG/DL
PROTHROMBIN TIME, THERAPEUTIC: 10.5 S (ref 9.5–11.5)
PTT: 28.5 S (ref 24–38)
RBC: ABNORMAL /HPF (ref 0–2)
RED BLOOD CELL COUNT: 4.12 10*6/UL (ref 3.9–5.7)
RED BLOOD CELL COUNT: 4.13 10*6/UL (ref 3.9–5.7)
RED BLOOD CELL COUNT: 4.2 M/MM3 (ref 4.34–5.6)
RED BLOOD CELL COUNT: 4.3 10*6/UL (ref 3.9–5.7)
RED BLOOD CELL COUNT: 4.33 10*6/UL (ref 3.9–5.7)
RED BLOOD CELL COUNT: 4.76 10*6/UL (ref 3.9–5.7)
RED CELL DISTRIBUTIO: 14 % (ref 11.9–15.9)
RED CELL DISTRIBUTION WIDTH: 12.8 % (ref 11.3–14.8)
RED CELL DISTRIBUTION WIDTH: 12.8 % (ref 11.3–14.8)
RED CELL DISTRIBUTION WIDTH: 12.9 % (ref 11.3–14.8)
RED CELL DISTRIBUTION WIDTH: 12.9 % (ref 11.3–14.8)
RED CELL DISTRIBUTION WIDTH: 13 % (ref 11.3–14.8)
SODIUM SERPL-SCNC: 135 MMOL/L (ref 136–146)
SODIUM SERPL-SCNC: 136 MMOL/L (ref 136–146)
SODIUM SERPL-SCNC: 139 MMOL/L (ref 136–146)
SODIUM SERPL-SCNC: 140 MMOL/L (ref 136–146)
SPECIFIC GRAVITY UA: 1.02 (ref 1–1.03)
SQUAMOUS EPITHELIAL: ABNORMAL /LPF
URINE CULTURE: NORMAL
URINE, BLOOD, AUTOMATED: ABNORMAL
UROBILINOGEN, URINE, AUTOMATED: 2 MG/DL
WBC: ABNORMAL /HPF (ref 0–5)
WHITE BLOOD CELL COU: 6.9 K/MM3 (ref 4–11)
WHITE BLOOD CELL COUNT: 5.2 10*3/UL (ref 4–10)
WHITE BLOOD CELL COUNT: 5.5 10*3/UL (ref 4–10)
WHITE BLOOD CELL COUNT: 5.5 10*3/UL (ref 4–10)
WHITE BLOOD CELL COUNT: 7 10*3/UL (ref 4–10)
WHITE BLOOD CELL COUNT: 7.7 10*3/UL (ref 4–10)

## 2020-01-01 PROCEDURE — 99214 OFFICE O/P EST MOD 30 MIN: CPT | Performed by: INTERNAL MEDICINE

## 2020-01-01 PROCEDURE — 96372 THER/PROPH/DIAG INJ SC/IM: CPT | Performed by: INTERNAL MEDICINE

## 2020-01-01 PROCEDURE — 80053 COMPREHEN METABOLIC PANEL: CPT | Performed by: INTERNAL MEDICINE

## 2020-01-01 PROCEDURE — 85025 COMPLETE CBC W/AUTO DIFF WBC: CPT | Performed by: INTERNAL MEDICINE

## 2020-01-01 PROCEDURE — 83615 LACTATE (LD) (LDH) ENZYME: CPT | Performed by: INTERNAL MEDICINE

## 2020-01-01 PROCEDURE — 71250 CT THORAX DX C-: CPT | Performed by: RADIOLOGY

## 2020-01-01 PROCEDURE — 99212 OFFICE O/P EST SF 10 MIN: CPT | Performed by: UROLOGY

## 2020-01-01 PROCEDURE — 77075 RADEX OSSEOUS SURVEY COMPL: CPT | Performed by: RADIOLOGY

## 2020-01-01 PROCEDURE — 85610 PROTHROMBIN TIME: CPT | Performed by: INTERNAL MEDICINE

## 2020-01-01 PROCEDURE — 78816 PET IMAGE W/CT FULL BODY: CPT

## 2020-01-01 PROCEDURE — 36415 COLL VENOUS BLD VENIPUNCTURE: CPT | Performed by: INTERNAL MEDICINE

## 2020-01-01 PROCEDURE — 96372 THER/PROPH/DIAG INJ SC/IM: CPT

## 2020-01-01 PROCEDURE — 51798 US URINE CAPACITY MEASURE: CPT | Performed by: UROLOGY

## 2020-01-01 PROCEDURE — 85730 THROMBOPLASTIN TIME PARTIAL: CPT | Performed by: INTERNAL MEDICINE

## 2020-01-01 PROCEDURE — A9552 F18 FDG: HCPCS

## 2020-01-01 PROCEDURE — 93010 ELECTROCARDIOGRAM REPORT: CPT | Performed by: INTERNAL MEDICINE

## 2020-01-01 PROCEDURE — 99442 TELEPHONE E&M BY PHYSICIAN EST PT NOT ORIG PREV 7 DAYS 11-20 MIN: CPT | Performed by: INTERNAL MEDICINE

## 2020-01-01 PROCEDURE — 74176 CT ABD & PELVIS W/O CONTRAST: CPT | Performed by: RADIOLOGY

## 2020-01-01 RX ORDER — CYANOCOBALAMIN 1000 UG/ML
1000 INJECTION, SOLUTION INTRAMUSCULAR; SUBCUTANEOUS
Status: SHIPPED | OUTPATIENT
Start: 2020-01-01 | End: 2021-05-28

## 2020-01-01 RX ORDER — CLOPIDOGREL BISULFATE 75 MG/1
75 TABLET ORAL DAILY
Qty: 90 TABLET | Refills: 3 | Status: SHIPPED | OUTPATIENT
Start: 2020-01-01

## 2020-01-01 RX ORDER — SIMVASTATIN 40 MG
40 TABLET ORAL NIGHTLY
Qty: 90 TABLET | Refills: 3 | Status: SHIPPED | OUTPATIENT
Start: 2020-01-01

## 2020-01-01 RX ORDER — MECLIZINE HYDROCHLORIDE 25 MG/1
TABLET ORAL
Qty: 30 TABLET | Refills: 1 | Status: SHIPPED | OUTPATIENT
Start: 2020-01-01

## 2020-01-01 RX ORDER — SIMVASTATIN 40 MG
TABLET ORAL
Qty: 90 TABLET | Refills: 0 | Status: SHIPPED | OUTPATIENT
Start: 2020-01-01 | End: 2020-01-01 | Stop reason: SDUPTHER

## 2020-01-01 RX ORDER — FLUDEOXYGLUCOSE F-18 300 MCI/ML
12.83 INJECTION INTRAVENOUS ONCE
Status: COMPLETED | OUTPATIENT
Start: 2020-01-01 | End: 2020-01-01

## 2020-01-01 RX ORDER — CLOTRIMAZOLE AND BETAMETHASONE DIPROPIONATE 10; .64 MG/G; MG/G
1 CREAM TOPICAL 2 TIMES DAILY
Qty: 30 G | Refills: 0 | Status: SHIPPED | OUTPATIENT
Start: 2020-01-01

## 2020-01-01 RX ORDER — MECLIZINE HYDROCHLORIDE 25 MG/1
TABLET ORAL
Qty: 30 TABLET | Refills: 1 | OUTPATIENT
Start: 2020-01-01

## 2020-01-01 RX ORDER — ALPRAZOLAM 0.25 MG/1
0.25 TABLET ORAL 3 TIMES DAILY
Qty: 30 TABLET | Refills: 0 | Status: SHIPPED | OUTPATIENT
Start: 2020-01-01 | End: 2020-01-01 | Stop reason: SDUPTHER

## 2020-01-01 RX ORDER — CLOPIDOGREL BISULFATE 75 MG/1
TABLET ORAL
Qty: 90 TABLET | Refills: 0 | Status: SHIPPED | OUTPATIENT
Start: 2020-01-01 | End: 2020-01-01 | Stop reason: SDUPTHER

## 2020-01-01 RX ORDER — ALPRAZOLAM 0.25 MG/1
0.25 TABLET ORAL 3 TIMES DAILY
Qty: 30 TABLET | Refills: 0 | Status: SHIPPED | OUTPATIENT
Start: 2020-01-01

## 2020-01-01 RX ADMIN — CYANOCOBALAMIN 1000 MCG: 1000 INJECTION, SOLUTION INTRAMUSCULAR; SUBCUTANEOUS at 09:25

## 2020-01-01 RX ADMIN — CYANOCOBALAMIN 1000 MCG: 1000 INJECTION, SOLUTION INTRAMUSCULAR; SUBCUTANEOUS at 09:37

## 2020-01-01 RX ADMIN — FLUDEOXYGLUCOSE F-18 12.83 MILLICURIE: 300 INJECTION INTRAVENOUS at 13:15

## 2020-01-01 RX ADMIN — CYANOCOBALAMIN 1000 MCG: 1000 INJECTION, SOLUTION INTRAMUSCULAR; SUBCUTANEOUS at 10:21

## 2020-01-01 RX ADMIN — CYANOCOBALAMIN 1000 MCG: 1000 INJECTION, SOLUTION INTRAMUSCULAR; SUBCUTANEOUS at 13:14

## 2020-01-01 SDOH — HEALTH STABILITY: MENTAL HEALTH: HOW OFTEN DO YOU HAVE A DRINK CONTAINING ALCOHOL?: NEVER

## 2020-01-01 ASSESSMENT — PAIN SCALES - GENERAL
PAINLEVEL: 0

## 2020-01-01 ASSESSMENT — PATIENT HEALTH QUESTIONNAIRE - PHQ9
1. LITTLE INTEREST OR PLEASURE IN DOING THINGS: NOT AT ALL
2. FEELING DOWN, DEPRESSED OR HOPELESS: NOT AT ALL
SUM OF ALL RESPONSES TO PHQ9 QUESTIONS 1 AND 2: 0
SUM OF ALL RESPONSES TO PHQ9 QUESTIONS 1 AND 2: 0

## 2020-07-31 ENCOUNTER — TELEPHONE (OUTPATIENT)
Dept: HEMATOLOGY/ONCOLOGY | Facility: HOSPITAL | Age: 80
End: 2020-07-31

## 2020-07-31 ENCOUNTER — LAB ENCOUNTER (OUTPATIENT)
Dept: LAB | Facility: HOSPITAL | Age: 80
End: 2020-07-31
Attending: INTERNAL MEDICINE
Payer: MEDICARE

## 2020-07-31 ENCOUNTER — OFFICE VISIT (OUTPATIENT)
Dept: HEMATOLOGY/ONCOLOGY | Age: 80
End: 2020-07-31
Attending: INTERNAL MEDICINE
Payer: MEDICARE

## 2020-07-31 VITALS
TEMPERATURE: 97 F | OXYGEN SATURATION: 97 % | DIASTOLIC BLOOD PRESSURE: 57 MMHG | HEIGHT: 71 IN | SYSTOLIC BLOOD PRESSURE: 111 MMHG | HEART RATE: 55 BPM | BODY MASS INDEX: 23.01 KG/M2 | RESPIRATION RATE: 16 BRPM | WEIGHT: 164.38 LBS

## 2020-07-31 DIAGNOSIS — C69.91 OCULAR MELANOMA, RIGHT (HCC): ICD-10-CM

## 2020-07-31 DIAGNOSIS — Z86.711 HISTORY OF PULMONARY EMBOLISM: ICD-10-CM

## 2020-07-31 DIAGNOSIS — N18.30 STAGE 3 CHRONIC KIDNEY DISEASE (HCC): ICD-10-CM

## 2020-07-31 DIAGNOSIS — C79.9 METASTATIC MELANOMA (HCC): Primary | ICD-10-CM

## 2020-07-31 DIAGNOSIS — C78.7 LIVER METASTASES (HCC): ICD-10-CM

## 2020-07-31 DIAGNOSIS — I25.10 CORONARY ARTERY DISEASE INVOLVING NATIVE CORONARY ARTERY OF NATIVE HEART WITHOUT ANGINA PECTORIS: ICD-10-CM

## 2020-07-31 DIAGNOSIS — C79.51 BONE METASTASIS (HCC): ICD-10-CM

## 2020-07-31 DIAGNOSIS — C79.9 METASTATIC MELANOMA (HCC): ICD-10-CM

## 2020-07-31 PROBLEM — G47.33 OSA (OBSTRUCTIVE SLEEP APNEA): Status: ACTIVE | Noted: 2020-07-31

## 2020-07-31 PROBLEM — N13.8 BPH WITH OBSTRUCTION/LOWER URINARY TRACT SYMPTOMS: Status: ACTIVE | Noted: 2017-11-03

## 2020-07-31 PROBLEM — N40.1 BPH WITH OBSTRUCTION/LOWER URINARY TRACT SYMPTOMS: Status: ACTIVE | Noted: 2017-11-03

## 2020-07-31 PROBLEM — E53.8 B12 DEFICIENCY: Status: ACTIVE | Noted: 2019-06-19

## 2020-07-31 PROCEDURE — 99205 OFFICE O/P NEW HI 60 MIN: CPT | Performed by: INTERNAL MEDICINE

## 2020-07-31 RX ORDER — UBIDECARENONE 10 MG
CAPSULE ORAL
COMMUNITY

## 2020-07-31 RX ORDER — ALPRAZOLAM 0.25 MG/1
0.25 TABLET ORAL NIGHTLY
COMMUNITY
Start: 2020-07-28

## 2020-07-31 RX ORDER — MECLIZINE HYDROCHLORIDE 25 MG/1
25 TABLET ORAL AS NEEDED
COMMUNITY

## 2020-07-31 RX ORDER — CLOPIDOGREL BISULFATE 75 MG/1
75 TABLET ORAL DAILY
COMMUNITY
Start: 2018-10-29

## 2020-07-31 NOTE — PROGRESS NOTES
Hematology/Oncology Initial Consultation Note    Patient Name: Roxie Martin  Medical Record Number: [id number]    YOB: 1940   Date of Consultation: 7/31/2020   PCP: Dr. Tran Butts   Other providers:  Dr. Barbara Cevallos ([facility])    Re aches, particularly involving his arms and thighs. Relatively poorly localized, he feels is quite bothersome when occurs, but is well controlled with Tylenol as needed only. He denies any bleeding.   He has been on aspirin and Plavix for history of prior Vitamins-Minerals (MULTI-VITAMIN/MINERALS) Oral Tab, Take 1 tablet by mouth every morning., Disp: , Rfl:   simvastatin (ZOCOR) 40 MG Oral Tab, Take 40 mg by mouth nightly., Disp: , Rfl:       Allergies:   No Known Allergies    Psychosocial History:  Social lymphadenopathy  Skin: no rashes or petechiae    Laboratory:  7/27/20- CBC- wbc 7.7, hgb 12.3, plt 292, INR 1.0  7/15/20- CMP normal except Na 135, , cre 1.4   7/8/20- LDH 1437  5/26/20-   1/24/20-   7/16/19-     No results foun -Nivolumab 3mg/kg IV D1               q3 weeks x 4 cycles, then nivolumab 240mg q2 weeks or 480mg q4wks thereafter  -follow CBC, CMP, LDH, TFTs    *Bone metastasis  -will discuss starting an osteoclast inhibitor at next visit.     *Cancer related pain  -Cur

## 2020-08-01 LAB — SARS-COV-2 RNA RESP QL NAA+PROBE: NOT DETECTED

## 2020-08-03 ENCOUNTER — NURSE ONLY (OUTPATIENT)
Dept: LAB | Facility: HOSPITAL | Age: 80
End: 2020-08-03
Attending: INTERNAL MEDICINE
Payer: MEDICARE

## 2020-08-03 ENCOUNTER — HOSPITAL ENCOUNTER (OUTPATIENT)
Dept: CT IMAGING | Facility: HOSPITAL | Age: 80
Discharge: HOME | End: 2020-08-03
Attending: INTERNAL MEDICINE
Payer: MEDICARE

## 2020-08-03 VITALS
DIASTOLIC BLOOD PRESSURE: 71 MMHG | BODY MASS INDEX: 22.96 KG/M2 | HEART RATE: 66 BPM | HEIGHT: 71 IN | WEIGHT: 164 LBS | SYSTOLIC BLOOD PRESSURE: 107 MMHG | TEMPERATURE: 98 F | OXYGEN SATURATION: 97 % | RESPIRATION RATE: 16 BRPM

## 2020-08-03 DIAGNOSIS — K76.9 LIVER LESION: ICD-10-CM

## 2020-08-03 DIAGNOSIS — K76.9 LIVER LESION: Primary | ICD-10-CM

## 2020-08-03 DIAGNOSIS — C79.9 METASTATIC MELANOMA (HCC): ICD-10-CM

## 2020-08-03 DIAGNOSIS — C78.7 LIVER METASTASES (HCC): ICD-10-CM

## 2020-08-03 LAB
DEPRECATED RDW RBC AUTO: 44.8 FL (ref 35.1–46.3)
ERYTHROCYTE [DISTWIDTH] IN BLOOD BY AUTOMATED COUNT: 13.1 % (ref 11–15)
HCT VFR BLD AUTO: 39.2 % (ref 39–53)
HGB BLD-MCNC: 12.1 G/DL (ref 13–17.5)
INR BLD: 1.02 (ref 0.89–1.11)
MCH RBC QN AUTO: 29 PG (ref 26–34)
MCHC RBC AUTO-ENTMCNC: 30.9 G/DL (ref 31–37)
MCV RBC AUTO: 94 FL (ref 80–100)
PLATELET # BLD AUTO: 299 10(3)UL (ref 150–450)
PSA SERPL DL<=0.01 NG/ML-MCNC: 13.7 SECONDS (ref 12.4–14.6)
RBC # BLD AUTO: 4.17 X10(6)UL (ref 3.8–5.8)
WBC # BLD AUTO: 6.1 X10(3) UL (ref 4–11)

## 2020-08-03 PROCEDURE — 88342 IMHCHEM/IMCYTCHM 1ST ANTB: CPT | Performed by: INTERNAL MEDICINE

## 2020-08-03 PROCEDURE — 47000 NEEDLE BIOPSY OF LIVER PERQ: CPT | Performed by: INTERNAL MEDICINE

## 2020-08-03 PROCEDURE — 99152 MOD SED SAME PHYS/QHP 5/>YRS: CPT | Performed by: INTERNAL MEDICINE

## 2020-08-03 PROCEDURE — 88341 IMHCHEM/IMCYTCHM EA ADD ANTB: CPT | Performed by: INTERNAL MEDICINE

## 2020-08-03 PROCEDURE — 85610 PROTHROMBIN TIME: CPT

## 2020-08-03 PROCEDURE — 88307 TISSUE EXAM BY PATHOLOGIST: CPT | Performed by: INTERNAL MEDICINE

## 2020-08-03 PROCEDURE — 77012 CT SCAN FOR NEEDLE BIOPSY: CPT | Performed by: INTERNAL MEDICINE

## 2020-08-03 PROCEDURE — 36415 COLL VENOUS BLD VENIPUNCTURE: CPT

## 2020-08-03 PROCEDURE — 85027 COMPLETE CBC AUTOMATED: CPT

## 2020-08-03 RX ORDER — FLUMAZENIL 0.1 MG/ML
0.2 INJECTION, SOLUTION INTRAVENOUS AS NEEDED
Status: DISCONTINUED | OUTPATIENT
Start: 2020-08-03 | End: 2020-08-05

## 2020-08-03 RX ORDER — HYDROCODONE BITARTRATE AND ACETAMINOPHEN 5; 325 MG/1; MG/1
1 TABLET ORAL ONCE
Status: COMPLETED | OUTPATIENT
Start: 2020-08-03 | End: 2020-08-03

## 2020-08-03 RX ORDER — MIDAZOLAM HYDROCHLORIDE 1 MG/ML
INJECTION INTRAMUSCULAR; INTRAVENOUS
Status: COMPLETED
Start: 2020-08-03 | End: 2020-08-03

## 2020-08-03 RX ORDER — NALOXONE HYDROCHLORIDE 0.4 MG/ML
80 INJECTION, SOLUTION INTRAMUSCULAR; INTRAVENOUS; SUBCUTANEOUS AS NEEDED
Status: DISCONTINUED | OUTPATIENT
Start: 2020-08-03 | End: 2020-08-05

## 2020-08-03 RX ORDER — MIDAZOLAM HYDROCHLORIDE 1 MG/ML
1 INJECTION INTRAMUSCULAR; INTRAVENOUS EVERY 5 MIN PRN
Status: ACTIVE | OUTPATIENT
Start: 2020-08-03 | End: 2020-08-03

## 2020-08-03 RX ORDER — SODIUM CHLORIDE 9 MG/ML
INJECTION, SOLUTION INTRAVENOUS CONTINUOUS
Status: DISCONTINUED | OUTPATIENT
Start: 2020-08-03 | End: 2020-08-05

## 2020-08-03 RX ADMIN — MIDAZOLAM HYDROCHLORIDE 1 MG: 1 INJECTION INTRAMUSCULAR; INTRAVENOUS at 11:06:00

## 2020-08-03 RX ADMIN — MIDAZOLAM HYDROCHLORIDE 0.5 MG: 1 INJECTION INTRAMUSCULAR; INTRAVENOUS at 11:14:00

## 2020-08-03 RX ADMIN — HYDROCODONE BITARTRATE AND ACETAMINOPHEN 1 TABLET: 5; 325 TABLET ORAL at 12:06:00

## 2020-08-03 RX ADMIN — SODIUM CHLORIDE: 9 INJECTION, SOLUTION INTRAVENOUS at 11:00:00

## 2020-08-03 NOTE — IMAGING NOTE
Dr Lawson Pantoja explained procedure topatient. Patient signed consent. Time out performed. Patient tolerated procedure well. Able to slide himslef from CT table to bed prior to transport. Patient dressing clean and dry. Patient denies CP distress.  Dr Lawson Pantoja made aware p

## 2020-08-03 NOTE — OR NURSING
Dr. Jade Maldonado called and updated on patient condition. Patient states pain has improved after norco, feels more comfortable and pain is now 4/10. States the pain goes from his right neck down flank. VSS, dressing intact.  Patient is aware when to re-start his bl

## 2020-08-04 ENCOUNTER — OFFICE VISIT (OUTPATIENT)
Dept: HEMATOLOGY/ONCOLOGY | Age: 80
End: 2020-08-04
Attending: INTERNAL MEDICINE
Payer: MEDICARE

## 2020-08-04 DIAGNOSIS — C79.9 METASTATIC MELANOMA (HCC): Primary | ICD-10-CM

## 2020-08-04 DIAGNOSIS — Z71.9 ENCOUNTER FOR HEALTH EDUCATION: ICD-10-CM

## 2020-08-04 DIAGNOSIS — C78.7 LIVER METASTASES (HCC): ICD-10-CM

## 2020-08-04 LAB
ALBUMIN SERPL-MCNC: 3 G/DL (ref 3.4–5)
ALBUMIN/GLOB SERPL: 0.6 {RATIO} (ref 1–2)
ALP LIVER SERPL-CCNC: 176 U/L (ref 45–117)
ALT SERPL-CCNC: 70 U/L (ref 16–61)
ANION GAP SERPL CALC-SCNC: 4 MMOL/L (ref 0–18)
AST SERPL-CCNC: 38 U/L (ref 15–37)
BASOPHILS # BLD AUTO: 0.02 X10(3) UL (ref 0–0.2)
BASOPHILS NFR BLD AUTO: 0.4 %
BILIRUB SERPL-MCNC: 0.3 MG/DL (ref 0.1–2)
BUN BLD-MCNC: 18 MG/DL (ref 7–18)
BUN/CREAT SERPL: 14.3 (ref 10–20)
CALCIUM BLD-MCNC: 9.1 MG/DL (ref 8.5–10.1)
CHLORIDE SERPL-SCNC: 103 MMOL/L (ref 98–112)
CO2 SERPL-SCNC: 31 MMOL/L (ref 21–32)
CREAT BLD-MCNC: 1.26 MG/DL (ref 0.7–1.3)
DEPRECATED RDW RBC AUTO: 44.8 FL (ref 35.1–46.3)
EOSINOPHIL # BLD AUTO: 0.09 X10(3) UL (ref 0–0.7)
EOSINOPHIL NFR BLD AUTO: 1.8 %
ERYTHROCYTE [DISTWIDTH] IN BLOOD BY AUTOMATED COUNT: 13.2 % (ref 11–15)
GLOBULIN PLAS-MCNC: 4.8 G/DL (ref 2.8–4.4)
GLUCOSE BLD-MCNC: 94 MG/DL (ref 70–99)
HCT VFR BLD AUTO: 38.4 % (ref 39–53)
HGB BLD-MCNC: 12 G/DL (ref 13–17.5)
IMM GRANULOCYTES # BLD AUTO: 0.01 X10(3) UL (ref 0–1)
IMM GRANULOCYTES NFR BLD: 0.2 %
LDH SERPL L TO P-CCNC: 568 U/L
LYMPHOCYTES # BLD AUTO: 1.07 X10(3) UL (ref 1–4)
LYMPHOCYTES NFR BLD AUTO: 21.3 %
M PROTEIN MFR SERPL ELPH: 7.8 G/DL (ref 6.4–8.2)
MCH RBC QN AUTO: 29.1 PG (ref 26–34)
MCHC RBC AUTO-ENTMCNC: 31.3 G/DL (ref 31–37)
MCV RBC AUTO: 93.2 FL (ref 80–100)
MONOCYTES # BLD AUTO: 0.42 X10(3) UL (ref 0.1–1)
MONOCYTES NFR BLD AUTO: 8.4 %
NEUTROPHILS # BLD AUTO: 3.41 X10 (3) UL (ref 1.5–7.7)
NEUTROPHILS # BLD AUTO: 3.41 X10(3) UL (ref 1.5–7.7)
NEUTROPHILS NFR BLD AUTO: 67.9 %
OSMOLALITY SERPL CALC.SUM OF ELEC: 288 MOSM/KG (ref 275–295)
PATIENT FASTING Y/N/NP: NO
PLATELET # BLD AUTO: 294 10(3)UL (ref 150–450)
POTASSIUM SERPL-SCNC: 4.1 MMOL/L (ref 3.5–5.1)
RBC # BLD AUTO: 4.12 X10(6)UL (ref 3.8–5.8)
SODIUM SERPL-SCNC: 138 MMOL/L (ref 136–145)
T4 FREE SERPL-MCNC: 1 NG/DL (ref 0.8–1.7)
TSI SER-ACNC: 4.61 MIU/ML (ref 0.36–3.74)
WBC # BLD AUTO: 5 X10(3) UL (ref 4–11)

## 2020-08-04 PROCEDURE — 99215 OFFICE O/P EST HI 40 MIN: CPT | Performed by: CLINICAL NURSE SPECIALIST

## 2020-08-04 NOTE — CONSULTS
Immunotherapy  Education    Learner:  Patient, Spouse and Family Member    Barriers / Limitations:  Cognitive limitations    Immunotherapy  education goals:  · Learn the drug names  · Administration schedule  · Routes of administration  · Treatment setting patient. I spent a total of 65 minutes with the patient, 100 % of that time was spent counseling patient regarding the above documented side effects and management, when to call provider and contact information.      Claudio Pena ANP-BC  Nurse Practition

## 2020-08-05 ENCOUNTER — TELEPHONE (OUTPATIENT)
Dept: HEMATOLOGY/ONCOLOGY | Facility: HOSPITAL | Age: 80
End: 2020-08-05

## 2020-08-05 NOTE — TELEPHONE ENCOUNTER
Patient call has concern about any testing with contrast, he only had one kidney and told to never have any dye given. He has a scan for tomorrow afraid of the contrast. Please call asap.

## 2020-08-05 NOTE — TELEPHONE ENCOUNTER
MD Francisco Gutierrez, Miya Woods, RN   Caller: Unspecified (Today,  3:20 PM)             I went through that with them already and I told him that the gadolinium contrast that is used for MRI does not injure the kidney. [facility] should get it with and withou

## 2020-08-05 NOTE — TELEPHONE ENCOUNTER
Called and spoke with pt that treatment is approved by insurance for Friday. To come on Friday as scheduled. Went through appointments and MRI appointment information for tomorrow evening.  Pt would like home phone to be the preferred number to contact pt,

## 2020-08-06 ENCOUNTER — HOSPITAL ENCOUNTER (OUTPATIENT)
Dept: MRI IMAGING | Age: 80
Discharge: HOME | End: 2020-08-06
Attending: INTERNAL MEDICINE
Payer: MEDICARE

## 2020-08-06 DIAGNOSIS — C79.9 METASTATIC MELANOMA (HCC): ICD-10-CM

## 2020-08-06 DIAGNOSIS — C69.91 OCULAR MELANOMA, RIGHT (HCC): ICD-10-CM

## 2020-08-06 PROCEDURE — 70553 MRI BRAIN STEM W/O & W/DYE: CPT | Performed by: INTERNAL MEDICINE

## 2020-08-06 PROCEDURE — A9575 INJ GADOTERATE MEGLUMI 0.1ML: HCPCS | Performed by: INTERNAL MEDICINE

## 2020-08-07 ENCOUNTER — OFFICE VISIT (OUTPATIENT)
Dept: HEMATOLOGY/ONCOLOGY | Age: 80
End: 2020-08-07
Attending: INTERNAL MEDICINE
Payer: MEDICARE

## 2020-08-07 VITALS
WEIGHT: 163.63 LBS | HEIGHT: 70.98 IN | OXYGEN SATURATION: 98 % | RESPIRATION RATE: 16 BRPM | SYSTOLIC BLOOD PRESSURE: 91 MMHG | TEMPERATURE: 97 F | BODY MASS INDEX: 22.91 KG/M2 | DIASTOLIC BLOOD PRESSURE: 58 MMHG | HEART RATE: 76 BPM

## 2020-08-07 DIAGNOSIS — C79.51 BONE METASTASIS (HCC): ICD-10-CM

## 2020-08-07 DIAGNOSIS — E53.8 B12 DEFICIENCY: ICD-10-CM

## 2020-08-07 DIAGNOSIS — C69.91 OCULAR MELANOMA, RIGHT (HCC): ICD-10-CM

## 2020-08-07 DIAGNOSIS — R79.89 ELEVATED LFTS: ICD-10-CM

## 2020-08-07 DIAGNOSIS — Z86.711 HISTORY OF PULMONARY EMBOLISM: ICD-10-CM

## 2020-08-07 DIAGNOSIS — I25.10 CORONARY ARTERY DISEASE INVOLVING NATIVE CORONARY ARTERY OF NATIVE HEART WITHOUT ANGINA PECTORIS: ICD-10-CM

## 2020-08-07 DIAGNOSIS — C79.9 METASTATIC MELANOMA (HCC): Primary | ICD-10-CM

## 2020-08-07 DIAGNOSIS — N18.30 STAGE 3 CHRONIC KIDNEY DISEASE (HCC): ICD-10-CM

## 2020-08-07 DIAGNOSIS — G89.3 CANCER ASSOCIATED PAIN: ICD-10-CM

## 2020-08-07 LAB — URATE SERPL-MCNC: 4.1 MG/DL (ref 3.5–7.2)

## 2020-08-07 PROCEDURE — 96417 CHEMO IV INFUS EACH ADDL SEQ: CPT

## 2020-08-07 PROCEDURE — 96413 CHEMO IV INFUSION 1 HR: CPT

## 2020-08-07 PROCEDURE — 99215 OFFICE O/P EST HI 40 MIN: CPT | Performed by: INTERNAL MEDICINE

## 2020-08-07 RX ORDER — ACETAMINOPHEN 500 MG
500 TABLET ORAL EVERY 6 HOURS PRN
COMMUNITY
End: 2020-08-12

## 2020-08-07 RX ORDER — HYDROCODONE BITARTRATE AND ACETAMINOPHEN 5; 325 MG/1; MG/1
1 TABLET ORAL EVERY 6 HOURS PRN
Qty: 60 TABLET | Refills: 0 | Status: SHIPPED | OUTPATIENT
Start: 2020-08-07 | End: 2020-08-12

## 2020-08-07 NOTE — PATIENT INSTRUCTIONS
For Dr. Daryl Esparza nurse line, call [phone] with any questions or concerns,  Monday through Friday 8:00 to 4:30, After hours or weekends for urgent needs.

## 2020-08-07 NOTE — PROGRESS NOTES
Pt here for MD f/u for metastatic melanoma and to start C1D1 Ipi/Nivo. Education completed 8/4 with APN and consent was signed. Med list updated.  Pt reports that he has a healing pressure ulcer located to the top of his buttocks that Dr. Arely Velez has addres

## 2020-08-07 NOTE — PROGRESS NOTES
Hematology/Oncology Clinic Follow Up Visit    Patient Name: Demetria Fontanez  Medical Record Number: [id number]    YOB: 1940    PCP: Dr. Kavon Trinidad   Other providers:  Dr. Ralph Owen ([facility])    Reason for Consultation:  Li Zuniga helpful. Bout of emesis the other night without any persistent nausea. He thinks it was due to drinking too much water at that time. No subsequent issue. No bowel or bladder changes.   He resumed taking plavix with aspirin after liver bx without any abn have social determinant information on file (likely too young). Social History Narrative      , has 2 children and 1 grandchild. Used to work as a IZZY  x 19 yrs.   Worked in [employer] prior to that    Social History    Tobacco Use Results   Component Value Date    WBC 5.0 08/04/2020    WBC 6.1 08/03/2020    HGB 12.0 (L) 08/04/2020    HGB 12.1 (L) 08/03/2020    HCT 38.4 (L) 08/04/2020    MCV 93.2 08/04/2020    MCH 29.1 08/04/2020    MCHC 31.3 08/04/2020    RDW 13.2 08/04/2020    PLT discussed. He will make an appt to see his dentist for clearance, will plan to start xgeva once this is done. *Cancer related pain  -not adequately controlled currently. Start norco 5/325mg prn. Continue Tylenol prn.   I advised that he can take up t

## 2020-08-10 ENCOUNTER — TELEPHONE (OUTPATIENT)
Dept: HEMATOLOGY/ONCOLOGY | Age: 80
End: 2020-08-10

## 2020-08-10 NOTE — TELEPHONE ENCOUNTER
Manuela Sat at [phone] wasn't able to get out of bed on Saturday, he took Norco and it didn't work, unbearable pain in his legs, Dr Cristina Powers pt.

## 2020-08-10 NOTE — TELEPHONE ENCOUNTER
Toxicities: C1 D1 Nivolumab/Ipilimumab on 8/7/2020    Pain    Pain: (Pt had pain in both legs that started Saturday evening. He took 1 Norco 5-325 and went to bed. Sunday morning he woke up with pain \"8-10/10. \" It was hard for him to get up and walk.  The

## 2020-08-11 ENCOUNTER — OFFICE VISIT (OUTPATIENT)
Dept: HEMATOLOGY/ONCOLOGY | Age: 80
End: 2020-08-11
Attending: INTERNAL MEDICINE
Payer: MEDICARE

## 2020-08-11 VITALS
HEIGHT: 70.98 IN | RESPIRATION RATE: 16 BRPM | BODY MASS INDEX: 22.54 KG/M2 | HEART RATE: 67 BPM | DIASTOLIC BLOOD PRESSURE: 73 MMHG | SYSTOLIC BLOOD PRESSURE: 118 MMHG | TEMPERATURE: 98 F | WEIGHT: 161 LBS | OXYGEN SATURATION: 97 %

## 2020-08-11 DIAGNOSIS — R43.2 DYSGEUSIA: ICD-10-CM

## 2020-08-11 DIAGNOSIS — G89.3 NEOPLASM RELATED PAIN: Primary | ICD-10-CM

## 2020-08-11 DIAGNOSIS — Z71.89 GOALS OF CARE, COUNSELING/DISCUSSION: ICD-10-CM

## 2020-08-11 PROCEDURE — 99215 OFFICE O/P EST HI 40 MIN: CPT | Performed by: NURSE PRACTITIONER

## 2020-08-12 ENCOUNTER — TELEPHONE (OUTPATIENT)
Dept: HEMATOLOGY/ONCOLOGY | Facility: HOSPITAL | Age: 80
End: 2020-08-12

## 2020-08-12 PROBLEM — Z51.5 PALLIATIVE CARE BY SPECIALIST: Status: ACTIVE | Noted: 2020-08-12

## 2020-08-12 RX ORDER — HYDROCODONE BITARTRATE AND ACETAMINOPHEN 10; 325 MG/1; MG/1
1 TABLET ORAL EVERY 6 HOURS PRN
Qty: 90 TABLET | Refills: 0 | Status: SHIPPED | OUTPATIENT
Start: 2020-08-14

## 2020-08-12 NOTE — PROGRESS NOTES
Palliative Care Consult Note     Patient Name: Rupinder Hernández   YOB: 1940   Medical Record Number: [id number]   CSN: [id number]   Date of visit: 8/11/2020     Reason for Consult:  Referred for Symptom management by Dr. 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 • Coenzyme Q10 10 MG Oral Cap Take by mouth. • Meclizine HCl 25 MG Oral Tab Take 25 mg by mouth as needed. • ALPRAZolam 0.25 MG Oral Tab Take 0.25 mg by mouth nightly.        • Fluocinonide 0.05 % Apply Externally Ointment as need strategies to maintain weight and intake  Dietician if needed      Planned Follow up: Return in about 2 weeks (around 8/25/2020).       60 total minutes spent with patient >50% of visit was spent in counseling and coordination of care for symptom management

## 2020-08-12 NOTE — TELEPHONE ENCOUNTER
Jeana Lee requesting a call back from Sherri Mello, he has some questions about medications discussed yesterday

## 2020-08-14 ENCOUNTER — TELEPHONE (OUTPATIENT)
Dept: HEMATOLOGY/ONCOLOGY | Facility: HOSPITAL | Age: 80
End: 2020-08-14

## 2020-08-14 RX ORDER — MORPHINE SULFATE 15 MG/1
15 TABLET, FILM COATED, EXTENDED RELEASE ORAL EVERY 12 HOURS SCHEDULED
Qty: 60 TABLET | Refills: 0 | Status: SHIPPED | OUTPATIENT
Start: 2020-08-14 | End: 2020-09-13

## 2020-08-14 NOTE — TELEPHONE ENCOUNTER
Spoke with patient regarding constipation and pain. He finally went today after 3 days with much difficulty. He is having pain in hip, low back and shoulders which is not new. He is taking norco 10mg 4X daily with little benefit.   Pain is never less ant

## 2020-08-14 NOTE — TELEPHONE ENCOUNTER
Patient called he has not had BM in 3 days, only taking Miralax daily. Also tried Orange juice, hot coffee. Suggested use of some MOM and increasing Miralax to BID.   He also requesting a call back from Della Garcia as bone pain worsening or not controlled with No

## 2020-08-17 ENCOUNTER — TELEPHONE (OUTPATIENT)
Dept: HEMATOLOGY/ONCOLOGY | Facility: HOSPITAL | Age: 80
End: 2020-08-17

## 2020-08-17 NOTE — TELEPHONE ENCOUNTER
Toxicities: C1 D1 Nivolumab/Ipilimumab on 8/7/2020   Spoke with YE Almodovar on 8/14/2020 regarding constipation & pain management    Condition Update: Constipation/Pain Management    Constipation: (Pt took Miralax 2 times on Saturday.  He woke up Sunday at 1

## 2020-08-17 NOTE — TELEPHONE ENCOUNTER
Patient feels pain is better controlled with MS Contin. He is not needing any norco for BTP. His pain is most intense when standing from a sitting position but now resolves quickly since starting morphine. He denies any SE. Denies constipation.   He i

## 2020-08-18 ENCOUNTER — DIETICIAN VISIT (OUTPATIENT)
Dept: HEMATOLOGY/ONCOLOGY | Facility: HOSPITAL | Age: 80
End: 2020-08-18

## 2020-08-19 ENCOUNTER — OFFICE VISIT (OUTPATIENT)
Dept: HEMATOLOGY/ONCOLOGY | Age: 80
End: 2020-08-19
Attending: INTERNAL MEDICINE
Payer: MEDICARE

## 2020-08-19 VITALS
DIASTOLIC BLOOD PRESSURE: 69 MMHG | OXYGEN SATURATION: 97 % | BODY MASS INDEX: 22.43 KG/M2 | SYSTOLIC BLOOD PRESSURE: 109 MMHG | HEART RATE: 77 BPM | RESPIRATION RATE: 16 BRPM | WEIGHT: 160.19 LBS | HEIGHT: 70.98 IN | TEMPERATURE: 97 F

## 2020-08-19 DIAGNOSIS — C79.51 BONE METASTASIS (HCC): ICD-10-CM

## 2020-08-19 DIAGNOSIS — L89.152 PRESSURE INJURY OF SACRAL REGION, STAGE 2 (HCC): ICD-10-CM

## 2020-08-19 DIAGNOSIS — C79.9 METASTATIC MELANOMA (HCC): Primary | ICD-10-CM

## 2020-08-19 DIAGNOSIS — R43.2 DYSGEUSIA: ICD-10-CM

## 2020-08-19 DIAGNOSIS — R63.8 DECREASED ORAL INTAKE: ICD-10-CM

## 2020-08-19 PROCEDURE — 99215 OFFICE O/P EST HI 40 MIN: CPT | Performed by: NURSE PRACTITIONER

## 2020-08-19 NOTE — PROGRESS NOTES
ANP Visit Note    Patient Name: Marisol Mesa   YOB: 1940   Medical Record Number: [id number]   CSN: [id number]   Date of visit: 8/19/2020     Chief Complaint/Reason for Visit:  Patient presents with:  Skin: sore/rash on bottom     History o drink.      Reviewed available chart notes, labs, and imaging.     Oncologic History:  *Metastatic ocular melanoma  -3/2016- diagnosed with ocular melanoma involving R eye  -4/2016- treated at [facility] with brachytherapy (Dr. Gregg Trent)  -7/6/20- CT c/a/p- [facility] mouth as needed. , Disp: , Rfl:   •  ALPRAZolam 0.25 MG Oral Tab, Take 0.25 mg by mouth nightly.  , Disp: , Rfl:   •  Fluocinonide 0.05 % Apply Externally Ointment, as needed. , Disp: , Rfl:   •  Ascorbic Acid (VITAMIN C OR), Take by mouth daily.   , Disp: at length with the patient and family. Recommended using tepid water rather than hot to cleanse the skin and decreasing the frequency of \"scrubbing\" the area to help minimize trauma.  Can use barrier cream such as zinc or A&D cream. Strongly encouraged of

## 2020-08-20 ENCOUNTER — OFFICE VISIT (OUTPATIENT)
Dept: HEMATOLOGY/ONCOLOGY | Age: 80
End: 2020-08-20
Attending: INTERNAL MEDICINE
Payer: MEDICARE

## 2020-08-20 ENCOUNTER — TELEPHONE (OUTPATIENT)
Dept: HEMATOLOGY/ONCOLOGY | Facility: HOSPITAL | Age: 80
End: 2020-08-20

## 2020-08-20 VITALS
HEIGHT: 70.98 IN | DIASTOLIC BLOOD PRESSURE: 69 MMHG | HEART RATE: 93 BPM | OXYGEN SATURATION: 97 % | BODY MASS INDEX: 22.17 KG/M2 | TEMPERATURE: 98 F | RESPIRATION RATE: 18 BRPM | WEIGHT: 158.38 LBS | SYSTOLIC BLOOD PRESSURE: 102 MMHG

## 2020-08-20 DIAGNOSIS — I95.1 ORTHOSTASIS: ICD-10-CM

## 2020-08-20 DIAGNOSIS — C79.9 METASTATIC MELANOMA (HCC): Primary | ICD-10-CM

## 2020-08-20 DIAGNOSIS — G89.3 CANCER ASSOCIATED PAIN: Primary | ICD-10-CM

## 2020-08-20 DIAGNOSIS — C79.51 BONE METASTASIS (HCC): ICD-10-CM

## 2020-08-20 DIAGNOSIS — C69.91 OCULAR MELANOMA, RIGHT (HCC): ICD-10-CM

## 2020-08-20 DIAGNOSIS — E86.0 DEHYDRATION: ICD-10-CM

## 2020-08-20 DIAGNOSIS — R68.2 DRY MOUTH: ICD-10-CM

## 2020-08-20 PROCEDURE — 96361 HYDRATE IV INFUSION ADD-ON: CPT

## 2020-08-20 PROCEDURE — 96374 THER/PROPH/DIAG INJ IV PUSH: CPT

## 2020-08-20 PROCEDURE — 99214 OFFICE O/P EST MOD 30 MIN: CPT | Performed by: CLINICAL NURSE SPECIALIST

## 2020-08-20 PROCEDURE — 96375 TX/PRO/DX INJ NEW DRUG ADDON: CPT

## 2020-08-20 RX ORDER — ONDANSETRON HYDROCHLORIDE 8 MG/1
8 TABLET, FILM COATED ORAL EVERY 8 HOURS PRN
Qty: 30 TABLET | Refills: 3 | Status: SHIPPED | OUTPATIENT
Start: 2020-08-20

## 2020-08-20 RX ORDER — MORPHINE SULFATE 4 MG/ML
2 INJECTION, SOLUTION INTRAMUSCULAR; INTRAVENOUS ONCE
Status: CANCELLED
Start: 2020-08-28

## 2020-08-20 RX ORDER — MORPHINE SULFATE 4 MG/ML
2 INJECTION, SOLUTION INTRAMUSCULAR; INTRAVENOUS ONCE
Status: CANCELLED
Start: 2020-09-17

## 2020-08-20 RX ORDER — MORPHINE SULFATE 4 MG/ML
2 INJECTION, SOLUTION INTRAMUSCULAR; INTRAVENOUS ONCE
Status: COMPLETED | OUTPATIENT
Start: 2020-08-20 | End: 2020-08-20

## 2020-08-20 RX ORDER — PROCHLORPERAZINE MALEATE 10 MG
10 TABLET ORAL EVERY 6 HOURS PRN
Qty: 30 TABLET | Refills: 3 | Status: SHIPPED | OUTPATIENT
Start: 2020-08-20

## 2020-08-20 RX ADMIN — MORPHINE SULFATE 2 MG: 4 INJECTION, SOLUTION INTRAMUSCULAR; INTRAVENOUS at 14:32:00

## 2020-08-20 NOTE — PROGRESS NOTES
Pt reports feeling \"better\" after IVF, antiemetics and morphine. Rates pain 2/10. Pt d/c home in stable condition, no new complaints. AVS provided.    Education Record    Learner:  Patient    Disease / Kaila Stoll    Barriers / Limitations:  None

## 2020-08-20 NOTE — TELEPHONE ENCOUNTER
Metastatic melanoma, bone mets - he is requesting a call back from [facility] regarding pain management. Had been instructed to take MS ER 9am and 9pm supplemented with Norco 10/325.   He states he took MS as prescribed and Norco at 130 am and 730 am today, pain

## 2020-08-20 NOTE — PROGRESS NOTES
ANP Visit Note    Patient Name: Rupinder Hernández   YOB: 1940   Medical Record Number: [id number]   CSN: [id number]   Date of visit: 8/20/2020   DORIS CONROY   No primary care provider on file.      Chief Complaint/Reason for Visit:  Ocular Melan ([facility]     B12 deficiency     BPH with obstruction/lower urinary tract symptoms     Stage 3 chronic kidney disease (HCC)     ELO (obstructive sleep apnea)     History of pulmonary embolism     Bone metastasis (HCC)     Elevated LFTs     Cancer associated pa Sexual Activity      Alcohol use: No      Drug use: No      Sexual activity: Not on file    Lifestyle      Physical activity:        Days per week: Not on file        Minutes per session: Not on file      Stress: Not on file    Relationships      Social co Meclizine HCl 25 MG Oral Tab, Take 25 mg by mouth as needed. , Disp: , Rfl:   •  ALPRAZolam 0.25 MG Oral Tab, Take 0.25 mg by mouth nightly.  , Disp: , Rfl:   •  Fluocinonide 0.05 % Apply Externally Ointment, as needed. , Disp: , Rfl:   •  Ascorbic Acid (V there are no active problems.     Planned Follow Up: as scheduled     Risk Level: High: Ocular Melanoma on Ipi/Nivo    Electronically Signed by:    Blas Morales [facility]-BC  Nurse Practitioner  Rosita Lesches [facility]

## 2020-08-20 NOTE — TELEPHONE ENCOUNTER
Patient called stating he has no saliva and he can't eat due to food \"getting stuck\". He states his mouth feels like \"paste\". He can hardly talk while on phone and sounds weak. He has dysgeusia, which he feels is getting worse.   He states he can

## 2020-08-21 ENCOUNTER — TELEPHONE (OUTPATIENT)
Dept: HEMATOLOGY/ONCOLOGY | Facility: HOSPITAL | Age: 80
End: 2020-08-21

## 2020-08-21 NOTE — TELEPHONE ENCOUNTER
Family called to let office know that Malathi Ash passed away this morning and to thank everyone for the comfort and care given.

## 2020-08-24 ENCOUNTER — APPOINTMENT (OUTPATIENT)
Dept: HEMATOLOGY/ONCOLOGY | Age: 80
End: 2020-08-24
Attending: INTERNAL MEDICINE
Payer: MEDICARE

## 2020-08-25 ENCOUNTER — APPOINTMENT (OUTPATIENT)
Dept: HEMATOLOGY/ONCOLOGY | Age: 80
End: 2020-08-25
Attending: INTERNAL MEDICINE
Payer: MEDICARE

## 2020-08-28 ENCOUNTER — APPOINTMENT (OUTPATIENT)
Dept: HEMATOLOGY/ONCOLOGY | Age: 80
End: 2020-08-28
Attending: INTERNAL MEDICINE
Payer: MEDICARE

## 2020-09-03 ENCOUNTER — TELEPHONE (OUTPATIENT)
Dept: CARDIOLOGY | Age: 80
End: 2020-09-03

## 2020-09-03 ENCOUNTER — TELEPHONE (OUTPATIENT)
Dept: PULMONOLOGY | Age: 80
End: 2020-09-03

## 2021-02-03 DIAGNOSIS — Z23 NEED FOR VACCINATION: ICD-10-CM

## 2021-03-01 ENCOUNTER — APPOINTMENT (OUTPATIENT)
Dept: UROLOGY | Age: 81
End: 2021-03-01

## 2021-05-25 VITALS
SYSTOLIC BLOOD PRESSURE: 102 MMHG | BODY MASS INDEX: 25.08 KG/M2 | OXYGEN SATURATION: 96 % | SYSTOLIC BLOOD PRESSURE: 112 MMHG | SYSTOLIC BLOOD PRESSURE: 104 MMHG | HEIGHT: 72 IN | DIASTOLIC BLOOD PRESSURE: 66 MMHG | HEART RATE: 60 BPM | HEART RATE: 50 BPM | HEART RATE: 60 BPM | BODY MASS INDEX: 25.42 KG/M2 | DIASTOLIC BLOOD PRESSURE: 68 MMHG | BODY MASS INDEX: 25.26 KG/M2 | BODY MASS INDEX: 22.27 KG/M2 | WEIGHT: 180.45 LBS | WEIGHT: 159.06 LBS | OXYGEN SATURATION: 96 % | WEIGHT: 162.92 LBS | DIASTOLIC BLOOD PRESSURE: 64 MMHG | OXYGEN SATURATION: 99 % | WEIGHT: 181.55 LBS | OXYGEN SATURATION: 97 % | HEIGHT: 71 IN | OXYGEN SATURATION: 98 % | RESPIRATION RATE: 18 BRPM | TEMPERATURE: 97.8 F | TEMPERATURE: 96.7 F | BODY MASS INDEX: 22.81 KG/M2 | TEMPERATURE: 98 F | DIASTOLIC BLOOD PRESSURE: 62 MMHG | WEIGHT: 185.19 LBS | SYSTOLIC BLOOD PRESSURE: 94 MMHG | HEIGHT: 71 IN | SYSTOLIC BLOOD PRESSURE: 110 MMHG | HEIGHT: 71 IN | RESPIRATION RATE: 18 BRPM | HEART RATE: 62 BPM | HEART RATE: 57 BPM | TEMPERATURE: 97 F | RESPIRATION RATE: 16 BRPM | DIASTOLIC BLOOD PRESSURE: 58 MMHG | TEMPERATURE: 96.9 F | RESPIRATION RATE: 18 BRPM | HEIGHT: 71 IN | RESPIRATION RATE: 18 BRPM

## (undated) NOTE — LETTER
Printed: 2020    Patient Name: Garfield Quintanilla  : [birth year]   Medical Record #: [id number]    Consent to [facility], understand that I have been diagnosed with Metastatic Melanoma.     I understand that the treatment s I have had the chance to ask questions about this treatment, and my questions have been answered to my satisfaction. I understand that I can contact my oncologist, Dr Jere Villagran or my Cancer Care Team at any time if I have questions, by calling [phone].